# Patient Record
Sex: MALE | Race: WHITE | NOT HISPANIC OR LATINO | Employment: STUDENT | URBAN - METROPOLITAN AREA
[De-identification: names, ages, dates, MRNs, and addresses within clinical notes are randomized per-mention and may not be internally consistent; named-entity substitution may affect disease eponyms.]

---

## 2018-07-26 ENCOUNTER — OFFICE VISIT (OUTPATIENT)
Dept: FAMILY MEDICINE | Facility: CLINIC | Age: 23
End: 2018-07-26
Payer: COMMERCIAL

## 2018-07-26 VITALS
RESPIRATION RATE: 18 BRPM | HEART RATE: 89 BPM | SYSTOLIC BLOOD PRESSURE: 128 MMHG | OXYGEN SATURATION: 99 % | TEMPERATURE: 97.9 F | DIASTOLIC BLOOD PRESSURE: 62 MMHG | WEIGHT: 147 LBS | BODY MASS INDEX: 20.58 KG/M2 | HEIGHT: 71 IN

## 2018-07-26 DIAGNOSIS — F41.9 ANXIETY: Primary | ICD-10-CM

## 2018-07-26 DIAGNOSIS — F32.9 MAJOR DEPRESSIVE DISORDER WITH SINGLE EPISODE, REMISSION STATUS UNSPECIFIED: ICD-10-CM

## 2018-07-26 DIAGNOSIS — Z23 IMMUNIZATION, TETANUS-DIPHTHERIA: ICD-10-CM

## 2018-07-26 PROCEDURE — 90714 TD VACC NO PRESV 7 YRS+ IM: CPT | Performed by: FAMILY MEDICINE

## 2018-07-26 PROCEDURE — 99203 OFFICE O/P NEW LOW 30 MIN: CPT | Mod: 25 | Performed by: FAMILY MEDICINE

## 2018-07-26 PROCEDURE — 90471 IMMUNIZATION ADMIN: CPT | Performed by: FAMILY MEDICINE

## 2018-07-26 ASSESSMENT — ANXIETY QUESTIONNAIRES
1. FEELING NERVOUS, ANXIOUS, OR ON EDGE: NEARLY EVERY DAY
6. BECOMING EASILY ANNOYED OR IRRITABLE: SEVERAL DAYS
7. FEELING AFRAID AS IF SOMETHING AWFUL MIGHT HAPPEN: NEARLY EVERY DAY
2. NOT BEING ABLE TO STOP OR CONTROL WORRYING: NEARLY EVERY DAY
5. BEING SO RESTLESS THAT IT IS HARD TO SIT STILL: NEARLY EVERY DAY
IF YOU CHECKED OFF ANY PROBLEMS ON THIS QUESTIONNAIRE, HOW DIFFICULT HAVE THESE PROBLEMS MADE IT FOR YOU TO DO YOUR WORK, TAKE CARE OF THINGS AT HOME, OR GET ALONG WITH OTHER PEOPLE: EXTREMELY DIFFICULT
3. WORRYING TOO MUCH ABOUT DIFFERENT THINGS: NEARLY EVERY DAY
GAD7 TOTAL SCORE: 19

## 2018-07-26 ASSESSMENT — PATIENT HEALTH QUESTIONNAIRE - PHQ9: 5. POOR APPETITE OR OVEREATING: NEARLY EVERY DAY

## 2018-07-26 NOTE — NURSING NOTE
Screening Questionnaire for Adult Immunization    Are you sick today?   No   Do you have allergies to medications, food, a vaccine component or latex?   No   Have you ever had a serious reaction after receiving a vaccination?   No   Do you have a long-term health problem with heart disease, lung disease, asthma, kidney disease, metabolic disease (e.g. diabetes), anemia, or other blood disorder?   No   Do you have cancer, leukemia, HIV/AIDS, or any other immune system problem?   No   In the past 3 months, have you taken medications that affect  your immune system, such as prednisone, other steroids, or anticancer drugs; drugs for the treatment of rheumatoid arthritis, Crohn s disease, or psoriasis; or have you had radiation treatments?   No   Have you had a seizure, or a brain or other nervous system problem?   No   During the past year, have you received a transfusion of blood or blood     products, or been given immune (gamma) globulin or antiviral drug?   No   For women: Are you pregnant or is there a chance you could become        pregnant during the next month?   No   Have you received any vaccinations in the past 4 weeks?   No     Immunization questionnaire answers were all negative.         Patient instructed to remain in clinic for 15 minutes afterwards, and to report any adverse reaction to me immediately.       Screening performed by Zafar Sanchez on 7/26/2018 at 12:17 PM.

## 2018-07-26 NOTE — MR AVS SNAPSHOT
After Visit Summary   7/26/2018    Terrell Young    MRN: [id number]           Patient Information     Date Of Birth          1995        Visit Information        Provider Department      7/26/2018 11:00 AM Mallika Saavedra MD [facility]        Today's Diagnoses     Anxiety    -  1    Major depressive disorder with single episode, remission status unspecified        Immunization, tetanus-diphtheria           Follow-ups after your visit        Additional Services     MENTAL HEALTH REFERRAL  - Adult; Outpatient Treatment; Individual/Couples/Family/Group Therapy/Health Psychology; G: [facility] [phone]; We will contact you to schedule the appointment or please call with any questions       All scheduling is subject to the client's specific insurance plan & benefits, provider/location availability, and provider clinical specialities.  Please arrive 15 minutes early for your first appointment and bring your completed paperwork.    Please be aware that coverage of these services is subject to the terms and limitations of your health insurance plan.  Call member services at your health plan with any benefit or coverage questions.                            Who to contact     If you have questions or need follow up information about today's clinic visit or your schedule please contact [facility] directly at [phone].  Normal or non-critical lab and imaging results will be communicated to you by MyChart, letter or phone within 4 business days after the clinic has received the results. If you do not hear from us within 7 days, please contact the clinic through MyChart or phone. If you have a critical or abnormal lab result, we will notify you by phone as soon as possible.  Submit refill requests through [employer] or call your pharmacy and they will forward the refill request to us. Please allow 3 business days for your refill to be  "completed.          Additional Information About Your Visit        [employer] Information     [employer] lets you send messages to your doctor, view your test results, renew your prescriptions, schedule appointments and more. To sign up, go to [email or web address] . Click on \"Log in\" on the left side of the screen, which will take you to the Welcome page. Then click on \"Sign up Now\" on the right side of the page.     You will be asked to enter the access code listed below, as well as some personal information. Please follow the directions to create your username and password.     Your access code is: [email or web address]  Expires: 10/24/2018 11:00 AM     Your access code will  in 90 days. If you need help or a new code, please call your [facility] clinic or [phone].        Care EveryWhere ID     This is your Care EveryWhere ID. This could be used by other organizations to access your [facility] medical records  [id number]        Your Vitals Were     Pulse Temperature Respirations Height Pulse Oximetry BMI (Body Mass Index)    89 97.9  F (36.6  C) (Oral) 18 1.797 m (5' 10.75\") 99% 20.65 kg/m2       Blood Pressure from Last 3 Encounters:   18 128/62   07/10/08 94/58    Weight from Last 3 Encounters:   18 66.7 kg (147 lb)   07/10/08 34.5 kg (76 lb) (10 %)*     * Growth percentiles are based on CDC 2-20 Years data.              We Performed the Following     MENTAL HEALTH REFERRAL  - Adult; Outpatient Treatment; Individual/Couples/Family/Group Therapy/Health Psychology; [facility]: [facility] [phone]; We will contact you to schedule the appointment or please call with any questions     TD PRESERV FREEJACK (7+ YRS)          Today's Medication Changes          These changes are accurate as of 18 11:55 AM.  If you have any questions, ask your nurse or doctor.               Start taking these medicines.        Dose/Directions    sertraline 50 MG tablet   Commonly known as:  ZOLOFT   Used for: "  Anxiety   Started by:  Mallika Saavedra MD        Take 1/2 tablet (25 mg) for 1-2 weeks, then increase to 1 tablet orally daily   Quantity:  30 tablet   Refills:  0            Where to get your medicines      These medications were sent to [id number] - [place], [place] - [address]  [address] [place] [address]     Phone:  [phone]     sertraline 50 MG tablet                Primary Care Provider Office Phone # Fax #    Mallika Saavedra [phone] [address]  [facility] [address]        Equal Access to Services     [facility]: Hadii orly ku hadasho Soomaali, waaxda luqadaha, qaybta kaalmada adeegyada, tato hackett . So [facility] [phone].    ATENCIÓN: Si habla español, tiene a mendez disposición servicios gratuitos de asistencia lingüística. [facility] [phone].    We comply with applicable federal civil rights laws and [place] laws. We do not discriminate on the basis of race, color, national origin, age, disability, sex, sexual orientation, or gender identity.            Thank you!     Thank you for choosing [facility]  for your care. Our goal is always to provide you with excellent care. Hearing back from our patients is one way we can continue to improve our services. Please take a few minutes to complete the written survey that you may receive in the mail after your visit with us. Thank you!             Your Updated Medication List - Protect others around you: Learn how to safely use, store and throw away your medicines at www.disposemymeds.org.          This list is accurate as of 7/26/18 11:55 AM.  Always use your most recent med list.                   Brand Name Dispense Instructions for use Diagnosis    sertraline 50 MG tablet    ZOLOFT    30 tablet    Take 1/2 tablet (25 mg) for 1-2 weeks, then increase to 1 tablet orally daily    Anxiety

## 2018-07-26 NOTE — PROGRESS NOTES
"  SUBJECTIVE:   Terrell Young is a 22 year old male who presents to clinic today for the following health issues:      New Patient/Transfer of Care from [facility]     Abnormal Mood Symptoms  Onset: A year ago     Description:   Depression: YES  Anxiety: YES    Accompanying Signs & Symptoms:  Still participating in activities that you used to enjoy: YES  Fatigue: YES  Irritability: YES  Difficulty concentrating: YES  Changes in appetite: YES  Problems with sleep: YES  Heart racing/beating fast : YES  Thoughts of hurting yourself or others: none    History:   Recent stress: YES- will discuss with MD  Prior depression hospitalization: None  Family history of depression: YES- possibly  (seen in family members but not sure if they are dx)  Family history of anxiety: YES. Mom     Precipitating factors:   Alcohol/drug use: None, besides casual drinking.  (weekends, college drinking- yolanda drinking up to 10 beers at a time). This did not worsen with the last year but notes it was just the culture of college. Drinking much less during the summer as back home working at [employer] now.     Alleviating factors:  Keeping active - distracts the mind from wondering.    Therapies Tried and outcome: None      sx's of anxiety started about 1 year ago  Feeling restless  Notes have not really ever been able to calm down. This has been slowly building     Just finished 4th year of college- [employer]. Considering transferring to [employer] to finish degree  Transitioned few jobs in past year.   Worrying about money and classes.     Hard time focusing  Brother with ADHD and mom thinks his dad has it.   Notes has always struggled with school. Mom was a teacher so she kept him going. Never failed out but \"always been hard\"  No hyperactivity but feel \"high strung\".     Some depression over the anxiety sx's and not knowing what is going on.  Feeling of dread a lot, prevents him from doing things  This last school year did \"pretty bad\". Failed one class " "due to poor attendance.   Struggled to get to class this year. This pushed him to come in for the evaluation today.     Times where he experiences panic- one time in the past week where he \"broke down\".  Feeling of panic 1+ or more times per day: feel tight in chest, heart races, shaking, hard to focus,     Sleep: hard to stay asleep for more 2-3 hours at a time. Waking 10 x's per night. Worsens as gets closer to morning. Takes 15-20 min to fall back to sleep  Feeling tired    Denies hx of ulises type sx's.     Considering taking a semester off this fall to deal with current sx's and then transferring to [employer] school to finish.   No SI but does think aobut \"what if I were dead\".     No supplements or meds      Problem list and histories reviewed & adjusted, as indicated.  Additional history: as documented    Patient Active Problem List   Diagnosis     Major depressive disorder with single episode, remission status unspecified     Anxiety     Past Surgical History:   Procedure Laterality Date     NO HISTORY OF SURGERY         Social History   Substance Use Topics     Smoking status: Never Smoker     Smokeless tobacco: Never Used     Alcohol use Yes      Comment: yolanda drinking on weekends     Family History   Problem Relation Age of Onset     Anxiety Disorder Mother          Current Outpatient Prescriptions   Medication Sig Dispense Refill     sertraline (ZOLOFT) 50 MG tablet Take 1/2 tablet (25 mg) for 1-2 weeks, then increase to 1 tablet orally daily 30 tablet 0     No Known Allergies    Reviewed and updated as needed this visit by clinical staff  Tobacco  Allergies  Meds  Problems  Med Hx  Surg Hx  Fam Hx  Soc Hx        Reviewed and updated as needed this visit by Provider  Allergies  Meds  Problems         ROS:   ROS: 10 point ROS neg other than the symptoms noted above in the HPI.      OBJECTIVE:     /62 (BP Location: Right arm, Patient Position: Sitting, Cuff Size: Adult Regular)  Pulse 89  Temp " "97.9  F (36.6  C) (Oral)  Resp 18  Ht 1.797 m (5' 10.75\")  Wt 66.7 kg (147 lb)  SpO2 99%  BMI 20.65 kg/m2  Body mass index is 20.65 kg/(m^2).  GENERAL: healthy, alert and no distress  NECK: no adenopathy, no asymmetry, masses, or scars and thyroid normal to palpation  RESP: lungs clear to auscultation - no rales, rhonchi or wheezes  CV: regular rate and rhythm, normal S1 S2, no S3 or S4, no murmur, click or rub, no peripheral edema and peripheral pulses strong  MS: no gross musculoskeletal defects noted, no edema    Diagnostic Test Results:  PHQ-9 SCORE 7/26/2018   Total Score 18     SCAR-7 SCORE 7/26/2018   Total Score 19         ASSESSMENT/PLAN:         ICD-10-CM    1. Anxiety F41.9 sertraline (ZOLOFT) 50 MG tablet     MENTAL HEALTH REFERRAL  - Adult; Outpatient Treatment; Individual/Couples/Family/Group Therapy/Health Psychology; [facility]: [facility] [phone]; We will contact you to schedule the appointment or please call with any questions   2. Major depressive disorder with single episode, remission status unspecified F32.9 MENTAL HEALTH REFERRAL  - Adult; Outpatient Treatment; Individual/Couples/Family/Group Therapy/Health Psychology; [facility]: [facility] [phone]; We will contact you to schedule the appointment or please call with any questions   3. Immunization, tetanus-diphtheria Z23 TD PRESERV FREE, IM (7+ YRS)     New dx of anxiety and depression. Patient wanting to start medication but also willing to start counseling too.   Reviewed onset of action of meds, common side effects and plan for close f/u. Encouraged call to clinic if symptoms worsening or adverse reactions. Reviewed concern/risk of increase Suicide in teens on anti-depressants. Patient verbally contracts for safety. Depression folder with crisis phone #'s given     Total time spent with patient is 28 min with over 50% counseling regarding above information      Mallika Saavedra MD  [place] " [facility]

## 2018-07-27 ASSESSMENT — ANXIETY QUESTIONNAIRES: GAD7 TOTAL SCORE: 19

## 2018-07-27 ASSESSMENT — PATIENT HEALTH QUESTIONNAIRE - PHQ9: SUM OF ALL RESPONSES TO PHQ QUESTIONS 1-9: 18

## 2018-08-31 ENCOUNTER — TELEPHONE (OUTPATIENT)
Dept: PEDIATRICS | Facility: CLINIC | Age: 23
End: 2018-08-31

## 2018-08-31 DIAGNOSIS — F41.9 ANXIETY: ICD-10-CM

## 2018-08-31 NOTE — TELEPHONE ENCOUNTER
Reason for Call:  Medication or medication refill:    Do you use a [facility]?  Name of the pharmacy and phone number for the current request:  [facility] [address] [place] [address] N.    Name of the medication requested: Pending Prescriptions:                       Disp   Refills    sertraline (ZOLOFT) 50 MG tablet          30 tab*0            Sig: Take 1/2 tablet (25 mg) for 1-2 weeks, then           increase to 1 tablet orally daily    Other request: Please call when approved down to last tab please refill today.  Can you write for 90 days supply.    Can we leave a detailed message on this number? YES    Phone number Yesy can be reached at: [phone]    Best Time: any    Call taken on 8/31/2018 at 8:54 AM by Kari Zimmerman

## 2018-08-31 NOTE — TELEPHONE ENCOUNTER
Routing refill request to provider for review/approval because:  Provider gave limited amount when prescribed on 7/26/18. Plan not noted for follow up. Patient is wanting 90 day supply.     Nathalia Moore RN, [facility]

## 2018-09-13 ENCOUNTER — OFFICE VISIT (OUTPATIENT)
Dept: FAMILY MEDICINE | Facility: CLINIC | Age: 23
End: 2018-09-13
Payer: COMMERCIAL

## 2018-09-13 VITALS
HEART RATE: 72 BPM | DIASTOLIC BLOOD PRESSURE: 66 MMHG | BODY MASS INDEX: 19.94 KG/M2 | TEMPERATURE: 98 F | HEIGHT: 71 IN | SYSTOLIC BLOOD PRESSURE: 120 MMHG | RESPIRATION RATE: 16 BRPM | WEIGHT: 142.4 LBS | OXYGEN SATURATION: 99 %

## 2018-09-13 DIAGNOSIS — F32.9 MAJOR DEPRESSIVE DISORDER WITH SINGLE EPISODE, REMISSION STATUS UNSPECIFIED: Primary | ICD-10-CM

## 2018-09-13 DIAGNOSIS — F41.9 ANXIETY: ICD-10-CM

## 2018-09-13 PROCEDURE — 99213 OFFICE O/P EST LOW 20 MIN: CPT | Performed by: FAMILY MEDICINE

## 2018-09-13 RX ORDER — BUPROPION HYDROCHLORIDE 150 MG/1
150 TABLET ORAL EVERY MORNING
Qty: 30 TABLET | Refills: 1 | Status: SHIPPED | OUTPATIENT
Start: 2018-09-13 | End: 2018-10-04

## 2018-09-13 ASSESSMENT — PATIENT HEALTH QUESTIONNAIRE - PHQ9: 5. POOR APPETITE OR OVEREATING: NEARLY EVERY DAY

## 2018-09-13 ASSESSMENT — PAIN SCALES - GENERAL: PAINLEVEL: NO PAIN (0)

## 2018-09-13 ASSESSMENT — ANXIETY QUESTIONNAIRES
IF YOU CHECKED OFF ANY PROBLEMS ON THIS QUESTIONNAIRE, HOW DIFFICULT HAVE THESE PROBLEMS MADE IT FOR YOU TO DO YOUR WORK, TAKE CARE OF THINGS AT HOME, OR GET ALONG WITH OTHER PEOPLE: SOMEWHAT DIFFICULT
3. WORRYING TOO MUCH ABOUT DIFFERENT THINGS: NEARLY EVERY DAY
5. BEING SO RESTLESS THAT IT IS HARD TO SIT STILL: SEVERAL DAYS
2. NOT BEING ABLE TO STOP OR CONTROL WORRYING: NEARLY EVERY DAY
1. FEELING NERVOUS, ANXIOUS, OR ON EDGE: NEARLY EVERY DAY
7. FEELING AFRAID AS IF SOMETHING AWFUL MIGHT HAPPEN: MORE THAN HALF THE DAYS
GAD7 TOTAL SCORE: 16
6. BECOMING EASILY ANNOYED OR IRRITABLE: SEVERAL DAYS

## 2018-09-13 NOTE — PROGRESS NOTES
SUBJECTIVE:   Terrell Young is a 22 year old male who presents to clinic today for the following health issues:    Depression and Anxiety Follow-Up    Status since last visit: Improved Sleep improvement     Other associated symptoms: Panic attacks and generalized anxiety, shortness of breath and chest tightness     Complicating factors:     Significant life event: Yes- Related being by himself. Not socially related.      Current substance abuse: Cannabis    PHQ-9 7/26/2018   Total Score 18   Q9: Suicide Ideation Not at all     SCAR-7 SCORE 7/26/2018   Total Score 19       PHQ-9  English  PHQ-9   Any Language  SCAR-7  Suicide Assessment Five-step Evaluation and Treatment (SAFE-T)    Amount of exercise or physical activity: None    Problems taking medications regularly: No    Medication side effects: none    Diet: regular (no restrictions)    -Patient took this semester off to work and is transferring from [employer] to [employer] next semester. Patient is working 6-7 days weekly and states symptoms do not affect job. Says that work helps anxiety because it is a distraction and he has done the job for a long time so knows what he is doing   -Feels he is sleeping better since he started sertraline because it makes him more tired throughout the day. Still wakes up a few times nightly but is able to fall back asleep. Gets at least 6 hours of sleep nightly, often more   -Continues to feel generalized anxiety daily, has experienced some panic attacks. Denies specific events that trigger panic attacks, states they usually occur when he is thinking about everything he needs to do and doesn't feel like he has the energy to do everything. Panic attacks occur a few times weekly   -Mood is stable since starting sertraline  -Feels intermittent nausea daily since starting sertraline. Has decreased appetite and is concerned about weight loss, says he drinks water to try to increase appetite. Thinks appetite loss is due to both mood and  "sertraline. Denies diarrhea or headaches.  -Patient takes medication around 4pm daily with food. Denies missing doses of medication. Goes to bed around 12am and wakes around 9am    -Denies any thoughts of self-harm  -Has not been using alcohol. Smokes cannabis at least once daily, says he has done it for a couple years   -Has counseling appointment scheduled for 09/18    Problem list and histories reviewed & adjusted, as indicated.  Additional history: as documented    Patient Active Problem List   Diagnosis     Major depressive disorder with single episode, remission status unspecified     Anxiety     Past Surgical History:   Procedure Laterality Date     NO HISTORY OF SURGERY         Social History   Substance Use Topics     Smoking status: Never Smoker     Smokeless tobacco: Never Used     Alcohol use Yes      Comment: yolanda drinking on weekends     Family History   Problem Relation Age of Onset     Anxiety Disorder Mother            Reviewed and updated as needed this visit by clinical staff  Tobacco  Allergies  Meds  Med Hx  Surg Hx  Fam Hx  Soc Hx      Reviewed and updated as needed this visit by Provider         ROS:  Constitutional, HEENT, cardiovascular, pulmonary, gi and gu systems are negative, except as otherwise noted.    This document serves as a record of the services and decisions personally performed by DORIAN RAMIREZ. It was created on his/her behalf by Anurag Kaur, a trained medical scribe. The creation of this document is based on the provider's statements to the medical scribe. Anurag Kaur, September 13, 2018 9:40 AM  OBJECTIVE:     /66 (BP Location: Left arm, Patient Position: Chair, Cuff Size: Adult Regular)  Pulse 72  Temp 98  F (36.7  C) (Oral)  Resp 16  Ht 1.797 m (5' 10.75\")  Wt 64.6 kg (142 lb 6.4 oz)  SpO2 99%  BMI 20 kg/m2  Body mass index is 20 kg/(m^2).  GENERAL: healthy, alert and no distress  PSYCH: mentation appears normal, affect " normal/bright    ASSESSMENT/PLAN:     1. Major depressive disorder with single episode, remission status unspecified  2. Anxiety  Anxiety not well controlled with current dose of sertraline. Discussed options including increasing dose of sertraline, starting Wellbutrin in addition to sertraline, or switching to Prozac. Patient elected to start Wellbutrin. Reviewed onset of action of meds, common side effects and plan for close f/u. Encouraged call to clinic if symptoms worsening or adverse reactions.  - buPROPion (WELLBUTRIN XL) 150 MG 24 hr tablet; Take 1 tablet (150 mg) by mouth every morning  Dispense: 30 tablet; Refill: 1      Patient Instructions   Taking the sertraline at bedtime might help you sleep better and decrease the nausea you are feeling. If you feel it is keeping you awake, you can try moving it to breakfast time.    Take Wellbutrin first thing in the morning because if you take it too late in the day it will keep you up.     Check in in 3 weeks to see how things are going on the Wellbutrin, but let me know right away if you are experiencing major side effects. We can check in with an office visit or a phone visit.    Try eating something in the morning even if you are not hungry.    You are due for a full physical exam.     Length of visit was 19 minutes with more than 50 percent of that time used for discussing medical concerns and education    The information in this document, created by the medical scribe for me, accurately reflects the services I personally performed and the decisions made by me. I have reviewed and approved this document for accuracy.   Mallika Saavedra MD  [facility]

## 2018-09-13 NOTE — MR AVS SNAPSHOT
After Visit Summary   9/13/2018    Terrell Young    MRN: [id number]           Patient Information     Date Of Birth          1995        Visit Information        Provider Department      9/13/2018 9:20 AM Mallika Saavedra MD [facility]        Today's Diagnoses     Major depressive disorder with single episode, remission status unspecified    -  1    Anxiety          Care Instructions    Taking the sertraline at bedtime might help you sleep better and decrease the nausea you are feeling. If you feel it is keeping you awake, you can try moving it to breakfast time.    Take Wellbutrin first thing in the morning because if you take it too late in the day it will keep you up.     Check in in 3 weeks to see how things are going on the Wellbutrin, but let me know right away if you are experiencing major side effects. We can check in with an office visit or a phone visit.    Try eating something in the morning even if you are not hungry.    You are due for a full physical exam.           Follow-ups after your visit        Your next 10 appointments already scheduled     Sep 18, 2018  2:00 PM CDT   (Arrive by 1:30 PM)   New Visit with Guerda Rogers LP   [facility] ([facility])    [address] [place] [id number] [phone]              Who to contact     If you have questions or need follow up information about today's clinic visit or your schedule please contact [facility] directly at [phone].  Normal or non-critical lab and imaging results will be communicated to you by MyChart, letter or phone within 4 business days after the clinic has received the results. If you do not hear from us within 7 days, please contact the clinic through MyChart or phone. If you have a critical or abnormal lab result, we will notify you by phone as soon as possible.  Submit refill requests through [employer] or call your  "pharmacy and they will forward the refill request to us. Please allow 3 business days for your refill to be completed.          Additional Information About Your Visit        [employer] Information     [employer] gives you secure access to your electronic health record. If you see a primary care provider, you can also send messages to your care team and make appointments. If you have questions, please call your primary care clinic.  If you do not have a primary care provider, please call [phone] and they will assist you.        Care EveryWhere ID     This is your Care EveryWhere ID. This could be used by other organizations to access your [place] medical records  [id number]        Your Vitals Were     Pulse Temperature Respirations Height Pulse Oximetry BMI (Body Mass Index)    72 98  F (36.7  C) (Oral) 16 1.797 m (5' 10.75\") 99% 20 kg/m2       Blood Pressure from Last 3 Encounters:   09/13/18 120/66   07/26/18 128/62   07/10/08 94/58    Weight from Last 3 Encounters:   09/13/18 64.6 kg (142 lb 6.4 oz)   07/26/18 66.7 kg (147 lb)   07/10/08 34.5 kg (76 lb) (10 %)*     * Growth percentiles are based on CDC 2-20 Years data.              Today, you had the following     No orders found for display         Today's Medication Changes          These changes are accurate as of 9/13/18 10:00 AM.  If you have any questions, ask your nurse or doctor.               Start taking these medicines.        Dose/Directions    buPROPion 150 MG 24 hr tablet   Commonly known as:  WELLBUTRIN XL   Used for:  Major depressive disorder with single episode, remission status unspecified, Anxiety   Started by:  Mallika Saavedra MD        Dose:  150 mg   Take 1 tablet (150 mg) by mouth every morning   Quantity:  30 tablet   Refills:  1            Where to get your medicines      These medications were sent to [facility] [place], [place]  [address] [place] [address] N.  [address] [place] [address] ILANA HUGGINS [address]     Phone:  [phone]     " buPROPion 150 MG 24 hr tablet                Primary Care Provider Office Phone # Fax #    Mallika Saavedra [phone] [address] [facility] [address]  [facility] [address]        Equal Access to Services     HELENA ANTUNEZ : Hadii orly canales hadfelicianoo Soomaali, waaxda luqadaha, qaybta kaalmada adeegyada, tato josé miguelin hayjacquelinn cyndeedarlene roche lew mcgowan. So [facility] [phone].    ATENCIÓN: Si habla español, tiene a mendez disposición servicios gratuitos de asistencia lingüística. Llame al [phone].    We comply with applicable federal civil rights laws and [place] laws. We do not discriminate on the basis of race, color, national origin, age, disability, sex, sexual orientation, or gender identity.            Thank you!     Thank you for choosing [facility]  for your care. Our goal is always to provide you with excellent care. Hearing back from our patients is one way we can continue to improve our services. Please take a few minutes to complete the written survey that you may receive in the mail after your visit with us. Thank you!             Your Updated Medication List - Protect others around you: Learn how to safely use, store and throw away your medicines at www.disposemymeds.org.          This list is accurate as of 9/13/18 10:00 AM.  Always use your most recent med list.                   Brand Name Dispense Instructions for use Diagnosis    buPROPion 150 MG 24 hr tablet    WELLBUTRIN XL    30 tablet    Take 1 tablet (150 mg) by mouth every morning    Major depressive disorder with single episode, remission status unspecified, Anxiety       sertraline 50 MG tablet    ZOLOFT    90 tablet    Take 1 tablet (50 mg) by mouth daily    Anxiety

## 2018-09-13 NOTE — PATIENT INSTRUCTIONS
Taking the sertraline at bedtime might help you sleep better and decrease the nausea you are feeling. If you feel it is keeping you awake, you can try moving it to breakfast time.    Take Wellbutrin first thing in the morning because if you take it too late in the day it will keep you up.     Check in in 3 weeks to see how things are going on the Wellbutrin, but let me know right away if you are experiencing major side effects. We can check in with an office visit or a phone visit.    Try eating something in the morning even if you are not hungry.    You are due for a full physical exam.

## 2018-09-14 ASSESSMENT — ANXIETY QUESTIONNAIRES: GAD7 TOTAL SCORE: 16

## 2018-09-14 ASSESSMENT — PATIENT HEALTH QUESTIONNAIRE - PHQ9: SUM OF ALL RESPONSES TO PHQ QUESTIONS 1-9: 14

## 2018-09-18 ENCOUNTER — OFFICE VISIT (OUTPATIENT)
Dept: PSYCHOLOGY | Facility: CLINIC | Age: 23
End: 2018-09-18
Attending: FAMILY MEDICINE
Payer: COMMERCIAL

## 2018-09-18 DIAGNOSIS — F41.9 ANXIETY: ICD-10-CM

## 2018-09-18 DIAGNOSIS — F32.9 MAJOR DEPRESSIVE DISORDER WITH SINGLE EPISODE, REMISSION STATUS UNSPECIFIED: Primary | ICD-10-CM

## 2018-09-18 PROCEDURE — 90791 PSYCH DIAGNOSTIC EVALUATION: CPT | Performed by: PSYCHOLOGIST

## 2018-09-18 NOTE — MR AVS SNAPSHOT
MRN:[id number]                      After Visit Summary   9/18/2018    Terrell Young    MRN: [id number]           Visit Information        Provider Department      9/18/2018 2:00 PM Guerda Rogers LP [facility]      Your next 10 appointments already scheduled     Oct 04, 2018  8:20 AM CDT   Office Visit with Mallika Saavedra MD   [facility] ([address] [id number] [phone]           Bring a current list of meds and any records pertaining to this visit. For Physicals, please bring immunization records and any forms needing to be filled out. Please arrive 10 minutes early to complete paperwork.            Nov 06, 2018  1:00 PM CST   Return Visit with Guerda Rogers LP   [facility] ([facility])    [address] [place] [id number] [phone]              MyChart Information     [employer] gives you secure access to your electronic health record. If you see a primary care provider, you can also send messages to your care team and make appointments. If you have questions, please call your primary care clinic.  If you do not have a primary care provider, please call [phone] and they will assist you.        Care EveryWhere ID     This is your Care EveryWhere ID. This could be used by other organizations to access your [place] medical records  [id number]        Equal Access to Services     HELENA ANTUNEZ AH: Hadii aad ku hadasho Somarcosali, waaxda luqadaha, qaybta kaalmada adeegyada, tato mcgowan. So [facility] [phone].    ATENCIÓN: Si habla español, tiene a mendez disposición servicios gratuitos de asistencia lingüística. Llame al [phone].    We comply with applicable federal civil rights laws and [place] laws. We do not discriminate on the basis of race, color, national origin, age,  disability, sex, sexual orientation, or gender identity.

## 2018-09-18 NOTE — Clinical Note
Terrell Mo began therapy for anxiety and depression. He said he is taking his medications and not smoking pot for a few days. I started cognitive behavioral therapy and will work on his feeling more motivation and purpose in life. Guerda

## 2018-09-19 ASSESSMENT — ANXIETY QUESTIONNAIRES
1. FEELING NERVOUS, ANXIOUS, OR ON EDGE: NEARLY EVERY DAY
5. BEING SO RESTLESS THAT IT IS HARD TO SIT STILL: MORE THAN HALF THE DAYS
3. WORRYING TOO MUCH ABOUT DIFFERENT THINGS: NEARLY EVERY DAY
7. FEELING AFRAID AS IF SOMETHING AWFUL MIGHT HAPPEN: SEVERAL DAYS
GAD7 TOTAL SCORE: 16
6. BECOMING EASILY ANNOYED OR IRRITABLE: SEVERAL DAYS
2. NOT BEING ABLE TO STOP OR CONTROL WORRYING: NEARLY EVERY DAY
IF YOU CHECKED OFF ANY PROBLEMS ON THIS QUESTIONNAIRE, HOW DIFFICULT HAVE THESE PROBLEMS MADE IT FOR YOU TO DO YOUR WORK, TAKE CARE OF THINGS AT HOME, OR GET ALONG WITH OTHER PEOPLE: SOMEWHAT DIFFICULT

## 2018-09-19 ASSESSMENT — PATIENT HEALTH QUESTIONNAIRE - PHQ9: 5. POOR APPETITE OR OVEREATING: NEARLY EVERY DAY

## 2018-09-19 NOTE — PROGRESS NOTES
"                                                                                                                                                                        Adult Intake Structured Interview  Standard Diagnostic Assessment      CLIENT'S NAME: Terrell Young  MRN:   [id number]  :   1995  ACCT. NUMBER: [id number]  DATE OF SERVICE: 18      Identifying Information:  Client is a 22 year old, , single male. Client was referred for counseling by Dr. Karlie Cantrell at [facility]. Client is currently and taking a break from college. Client attended the session alone.       Client's Statement of Presenting Concern:  Client reports the reason for seeking therapy at this time as having anxiety and depression that has gotten worse and affecting school. He feels lack of direction and purpose in life. Client stated that his symptoms have resulted in the following functional impairments: academic performance, educational activities, management of the household and or completion of tasks, relationship(s), self-care, social interactions and work / vocational responsibilities      History of Presenting Concern:  Client reports that these problem(s) he has experienced most all of life and are worse now in college. . Client has attempted to resolve these concerns in the past through starting medications recently, talking to family and friends. Client reports that other professional(s) are involved in providing support / services.     Social History:  Client reported he grew up in [place]. They were the second born of 2 children. This is an intact family and parents remain . Client reported that his childhood was \"normal suburb upbringing, public schools, fairly involved/ caring parents.\" He played soccer in school. Client described his current relationships with family of origin as close, no outstanding issues.    Client reported a history of 1 committed " relationship in HS and college for 3 1/2 years - not now.  Client reported having 0 children. Client identified few stable and meaningful social connections. Client reported that he has not been involved with the legal system.  Client's highest education level was some college. Client did identify the following learning problems: attention, concentration, reading and but gets by because of his intelligence. There are no ethnic, cultural or Pentecostalism factors that may be relevant for therapy. Client identified his preferred language to be English. Client reported he does not need the assistance of an  or other support involved in therapy. Modifications will not be used to assist communication in therapy. Client did not serve in the .     Client reports family history includes Anxiety Disorder in his mother.    Mental Health History:  Client reported the following biological family members or relatives with mental health issues: Mother experienced Anxiety and dad may have undiagnosed depression.  Client has not been previously diagnosed with a mental health diagnosis.  Client has not received mental health services in the past.  Hospitalizations: None.  Client is currently receiving the following services: medication(s) from physician / PCP.    Chemical Health History:  Client reported no family history of chemical health issues. Client has not received chemical dependency treatment in the past. Client is not currently receiving any chemical dependency treatment. Client reports no problems as a result of their drinking / drug use.     Client Reports:  Client denies using alcohol.  Client denies using tobacco.  Client reports using marijuana 4-5 times per week and smokes 1 at a time. Client started using marijuana at age end of HS.  Client reports using caffeine 1 times per day and drinks 2 at a time. Client started using caffeine at age high school.  Client denies using street drugs.  Client denies  the non-medical use of prescription or over the counter drugs.    CAGE: C     Patient felt they ought to CUT down on your drinking (or drug use).  G     Patient felt bad or GUILTY about their drinking (or drug use).  E     Patient had a drink (or drug use) as an EYE OPENER first thing in the morning to steady his/her nerves, get the day started, or get rid of a hangover.   Based on the positive Cage-Aid score and clinical interview there  are indications of drug or alcohol abuse. Terrell said he thinks he should reduce pot use so has for the few days he is on his medications..    Discussed the general effects of drugs and alcohol on health and well-being.     Significant Losses / Trauma / Abuse / Neglect Issues:  There are no indications or report of: significant losses, trauma, abuse or neglect.    Issues of possible neglect are not present.    Medical Issues:  Client has had a physical exam to rule out medical causes for current symptoms. Date of last physical exam was within the past year. Client was encouraged to follow up with PCP if symptoms were to develop. The client has a [facility] Provider, who is named Mallika Saavedra.. The client reports not having a psychiatrist. Client reports no current medical concerns. The client denies the presence of chronic or episodic pain. There are not significant nutritional concerns. Terrell reports he loses his appetite when he is anxious.    Client reports current meds as:   Current Outpatient Prescriptions   Medication Sig     buPROPion (WELLBUTRIN XL) 150 MG 24 hr tablet Take 1 tablet (150 mg) by mouth every morning     sertraline (ZOLOFT) 50 MG tablet Take 1 tablet (50 mg) by mouth daily     No current facility-administered medications for this visit.        Client Allergies:  No Known Allergies  no known allergies to medications    Medical History:  Past Medical History:   Diagnosis Date     Allergic rhinitis, cause unspecified     seasonal allergies      Other chronic allergic conjunctivitis          Medication Adherence:  Client reports taking prescribed medications as prescribed.    Client was provided recommendation to follow-up with prescribing physician.    Mental Status Assessment:  Appearance:   Appropriate   Eye Contact:   Poor  Psychomotor Behavior: Normal  Retarded (Slowed)   Attitude:   Cooperative   Orientation:   All  Speech   Rate / Production: Normal    Volume:  Soft   Mood:    Anxious  Depressed   Affect:    Appropriate  Flat   Thought Content:  Clear  Rumination   Thought Form:  Coherent  Logical   Insight:    Fair       Review of Symptoms:  Depression: Sleep Interest Guilt Energy Concentration Appetite Psychomotor slowing or agitation Helpless Worthless Ruminations  Dona:  No symptoms  Psychosis: No symptoms  Anxiety: Worries Nervousness restless and thinks something bad will happen  Panic:  Palpitations Tremors Shortness of Breath Tingling Sense of Impending Doom  Post Traumatic Stress Disorder: No symptoms  Obsessive Compulsive Disorder: No symptoms  Eating Disorder: No symptoms  Oppositional Defiant Disorder: No symptoms  ADD / ADHD: Attention Task Completion Distractiblity  Conduct Disorder: No symptoms      Safety Assessment:    History of Safety Concerns:   Client denied a history of suicidal ideation.    Client denied a history of suicide attempts.    Client denied a history of homicidal ideation.    Client denied a history of self-injurious ideation and behaviors.    Client denied a history of personal safety concerns.    Client denied a history of assaultive behaviors.        Current Safety Concerns:  Client denies current suicidal ideation.    Client denies current homicidal ideation and behaviors.  Client denies current self-injurious ideation and behaviors.    Client denies current concerns for personal safety.    Client reports the following protective factors: positive relationships positive family connections, forward/future  oriented thinking, dedication to family/friends, safe and stable environment, effectively controls impulses, regular physical activity, sense of belonging to family and friends - soccer team, secure attachment, abstinence from substances, adherence with prescribed medication, agreement to use safety plan, living with other people, daily obligations, structured day, committment to well-being, healthy fear of risky behaviors or pain and sense of personal control or determination    Client reports there are no firearms in the house.     Plan for Safety and Risk Management:  A safety and risk management plan has not been developed at this time, however client was given the after-hours number / 911 should there be a change in any of these risk factors.    Client's Strengths and Limitations:  Client identified the following strengths or resources that will help him succeed in counseling: commitment to health and well being, nichol / spirituality, friends / good social support, family support and intelligence. Client identified the following supports: family and friends. Things that may interfere with the client's success in counseling include: not sure it will help.    Diagnostic Criteria:  A. Excessive anxiety and worry about a number of events or activities (such as work or school performance).   B. The person finds it difficult to control the worry.  C. Select 3 or more symptoms (required for diagnosis). Only one item is required in children.   - Restlessness or feeling keyed up or on edge.    - Difficulty concentrating or mind going blank.    - Irritability.    - Muscle tension.    - Sleep disturbance (difficulty falling or staying asleep, or restless unsatisfying sleep).     - The aformentioned symptoms began many year(s) ago and occurs 7 days per week and is experienced as mild.  CRITERIA (A-C) REPRESENT A MAJOR DEPRESSIVE EPISODE - SELECT THESE CRITERIA  A) Recurrent episode(s) - symptoms have been present during  the same 2-week period and represent a change from previous functioning 5 or more symptoms (required for diagnosis)   - Depressed mood. Note: In children and adolescents, can be irritable mood.     - Diminished interest or pleasure in all, or almost all, activities.    - Significant weight loss when not dieting decrease in appetite.    - Decreased sleep.    - Psychomotor activity retardation.    - Fatigue or loss of energy.    - Feelings of worthlessness or inappropriate and excessive guilt.    - Diminished ability to think or concentrate, or indecisiveness.    - Recurrent thoughts of death (not just fear of dying), NO suicidal ideation without a specific plan, or NO a suicide attempt or a specific plan for committing suicide.     Functional Status:  Client's symptoms are causing reduced functional status in the following areas: Academics / Education - changed shcools and taking a break to deal with mental health  Activities of Daily Living - low motivation, low appetite, inconsistent sleep  Occupational / Vocational - not interested, feels lack of purpose  Social / Relational - anxiety and is quiet      DSM5 Diagnoses: (Sustained by DSM5 Criteria Listed Above)  Diagnoses: 296.31 (F33.0) Major Depressive Disorder, Recurrent Episode, Mild _ and With melancholic features  300.02 (F41.1) Generalized Anxiety Disorder  Psychosocial & Contextual Factors: school challenges, lack of direction  WHODAS 2.0 (12 item)            This questionnaire asks about difficulties due to health conditions. Health conditions include disease or illnesses, other health problems that may be short or long lasting, injuries, mental health or emotional problems, and problems with alcohol or drugs.                     Think back over the past 30 days and answer these questions, thinking about how much difficulty you had doing the following activities. For each question, please [place] only one response.    S1 Standing for long periods such as 30  minutes? None =         1   S2 Taking care of household responsibilities? Mild =           2   S3 Learning a new task, for example, learning how to get to a new place? Moderate =   3   S4 How much of a problem do you have joining community activities (for example, festivals, Alevism or other activities) in the same way as anyone else can? Moderate =   3   S5 How much have you been emotionally affected by your health problems? Moderate =   3     In the past 30 days, how much difficulty did you have in:   S6 Concentrating on doing something for ten minutes? Moderate =   3   S7 Walking a long distance such as a kilometer (or equivalent)? None =         1   S8 Washing your whole body? None =         1   S9 Getting dressed? None =         1   S10 Dealing with people you do not know? Mild =           2   S11 Maintaining a friendship? None =         1   S12 Your day to day work? None =         1     H1 Overall, in the past 30 days, how many days were these difficulties present? Record number of days everyday   H2 In the past 30 days, for how many days were you totally unable to carry out your usual activities or work because of any health condition? Record number of days  none   H3 In the past 30 days, not counting the days that you were totally unable, for how many days did you cut back or reduce your usual activities or work because of any health condition? Record number of days none     Attendance Agreement:  Client has signed Attendance Agreement:No: He was running late so I prioritized talking time      Collaboration:  The client is receiving treatment / structured support from the following professional(s) / service and treatment. Collaboration will be initiated with: primary care physician.      Preliminary Treatment Plan:  The client reports no currently identified Alevism, ethnic or cultural issues relevant to therapy.     services are not indicated.    Modifications to assist communication are not  indicated.    The concerns identified by the client will be addressed in therapy.    Initial Treatment will focus on: Depressed Mood - and lack of purpose for school  Anxiety - and social fears.  The focus of initial interventions will be to alleviate anxiety, alleviate depressed mood, facilitate appropriate expression of feelings, increase ability to function adaptively, increase self esteem, reduce panic attacks, teach CBT skills, teach DBT skills, teach mindfulness skills and teach stress mangement techniques. He was taught CBT and given homework to log his thoughts.    Referral to another professional/service is not indicated at this time..    A Release of Information is not needed at this time.    Report to child / adult protection services was NA.    Client will have access to their [facility]' medical record.    Guerda Rogers, AMAURY  September 19, 2018

## 2018-09-20 ASSESSMENT — ANXIETY QUESTIONNAIRES: GAD7 TOTAL SCORE: 16

## 2018-09-20 ASSESSMENT — PATIENT HEALTH QUESTIONNAIRE - PHQ9: SUM OF ALL RESPONSES TO PHQ QUESTIONS 1-9: 15

## 2018-10-04 ENCOUNTER — OFFICE VISIT (OUTPATIENT)
Dept: FAMILY MEDICINE | Facility: CLINIC | Age: 23
End: 2018-10-04
Payer: COMMERCIAL

## 2018-10-04 VITALS
DIASTOLIC BLOOD PRESSURE: 60 MMHG | OXYGEN SATURATION: 99 % | BODY MASS INDEX: 21.14 KG/M2 | RESPIRATION RATE: 16 BRPM | TEMPERATURE: 98.2 F | HEIGHT: 71 IN | WEIGHT: 151 LBS | HEART RATE: 89 BPM | SYSTOLIC BLOOD PRESSURE: 108 MMHG

## 2018-10-04 DIAGNOSIS — Z11.4 SCREENING FOR HIV (HUMAN IMMUNODEFICIENCY VIRUS): ICD-10-CM

## 2018-10-04 DIAGNOSIS — F41.9 ANXIETY: ICD-10-CM

## 2018-10-04 DIAGNOSIS — F32.9 MAJOR DEPRESSIVE DISORDER WITH SINGLE EPISODE, REMISSION STATUS UNSPECIFIED: ICD-10-CM

## 2018-10-04 DIAGNOSIS — Z00.00 ENCOUNTER FOR ROUTINE ADULT HEALTH EXAMINATION WITHOUT ABNORMAL FINDINGS: Primary | ICD-10-CM

## 2018-10-04 DIAGNOSIS — Z11.3 SCREENING EXAMINATION FOR VENEREAL DISEASE: ICD-10-CM

## 2018-10-04 PROCEDURE — 87491 CHLMYD TRACH DNA AMP PROBE: CPT | Performed by: FAMILY MEDICINE

## 2018-10-04 PROCEDURE — 87591 N.GONORRHOEAE DNA AMP PROB: CPT | Performed by: FAMILY MEDICINE

## 2018-10-04 PROCEDURE — 99395 PREV VISIT EST AGE 18-39: CPT | Performed by: FAMILY MEDICINE

## 2018-10-04 RX ORDER — BUPROPION HYDROCHLORIDE 150 MG/1
150 TABLET ORAL EVERY MORNING
Qty: 90 TABLET | Refills: 1 | Status: SHIPPED | OUTPATIENT
Start: 2018-10-04 | End: 2019-05-13

## 2018-10-04 ASSESSMENT — ANXIETY QUESTIONNAIRES
IF YOU CHECKED OFF ANY PROBLEMS ON THIS QUESTIONNAIRE, HOW DIFFICULT HAVE THESE PROBLEMS MADE IT FOR YOU TO DO YOUR WORK, TAKE CARE OF THINGS AT HOME, OR GET ALONG WITH OTHER PEOPLE: SOMEWHAT DIFFICULT
3. WORRYING TOO MUCH ABOUT DIFFERENT THINGS: SEVERAL DAYS
GAD7 TOTAL SCORE: 6
5. BEING SO RESTLESS THAT IT IS HARD TO SIT STILL: NOT AT ALL
6. BECOMING EASILY ANNOYED OR IRRITABLE: SEVERAL DAYS
7. FEELING AFRAID AS IF SOMETHING AWFUL MIGHT HAPPEN: SEVERAL DAYS
1. FEELING NERVOUS, ANXIOUS, OR ON EDGE: SEVERAL DAYS
2. NOT BEING ABLE TO STOP OR CONTROL WORRYING: SEVERAL DAYS

## 2018-10-04 ASSESSMENT — PATIENT HEALTH QUESTIONNAIRE - PHQ9: 5. POOR APPETITE OR OVEREATING: SEVERAL DAYS

## 2018-10-04 NOTE — LETTER
My Depression Action Plan  Name: Terrell Young   Date of Birth 1995  Date: 10/4/2018    My doctor: Mallika Saavedra   My clinic: [address] [address] [phone]          GREEN    ZONE   Good Control    What it looks like:     Things are going generally well. You have normal up s and down s. You may even feel depressed from time to time, but bad moods usually last less than a day.   What you need to do:  1. Continue to care for yourself (see self care plan)  2. Check your depression survival kit and update it as needed  3. Follow your physician s recommendations including any medication.  4. Do not stop taking medication unless you consult with your physician first.           YELLOW         ZONE Getting Worse    What it looks like:     Depression is starting to interfere with your life.     It may be hard to get out of bed; you may be starting to isolate yourself from others.    Symptoms of depression are starting to last most all day and this has happened for several days.     You may have suicidal thoughts but they are not constant.   What you need to do:     1. Call your care team, your response to treatment will improve if you keep your care team informed of your progress. Yellow periods are signs an adjustment may need to be made.     2. Continue your self-care, even if you have to fake it!    3. Talk to someone in your support network    4. Open up your depression survival kit           RED    ZONE Medical Alert - Get Help    What it looks like:     Depression is seriously interfering with your life.     You may experience these or other symptoms: You can t get out of bed most days, can t work or engage in other necessary activities, you have trouble taking care of basic hygiene, or basic responsibilities, thoughts of suicide or death that will not go away, self-injurious behavior.     What you need to do:  1. Call your  care team and request a same-day appointment. If they are not available (weekends or after hours) call your local crisis line, emergency room or 911.            Depression Self Care Plan / Survival Kit    Self-Care for Depression  Here s the deal. Your body and mind are really not as separate as most people think.  What you do and think affects how you feel and how you feel influences what you do and think. This means if you do things that people who feel good do, it will help you feel better.  Sometimes this is all it takes.  There is also a place for medication and therapy depending on how severe your depression is, so be sure to consult with your medical provider and/ or Behavioral Health Consultant if your symptoms are worsening or not improving.     In order to better manage my stress, I will:    Exercise  Get some form of exercise, every day. This will help reduce pain and release endorphins, the  feel good  chemicals in your brain. This is almost as good as taking antidepressants!  This is not the same as joining a gym and then never going! (they count on that by the way ) It can be as simple as just going for a walk or doing some gardening, anything that will get you moving.      Hygiene   Maintain good hygiene (Get out of bed in the morning, Make your bed, Brush your teeth, Take a shower, and Get dressed like you were going to work, even if you are unemployed).  If your clothes don't fit try to get ones that do.    Diet  I will strive to eat foods that are good for me, drink plenty of water, and avoid excessive sugar, caffeine, alcohol, and other mood-altering substances.  Some foods that are helpful in depression are: complex carbohydrates, B vitamins, flaxseed, fish or fish oil, fresh fruits and vegetables.    Psychotherapy  I agree to participate in Individual Therapy (if recommended).    Medication  If prescribed medications, I agree to take them.  Missing doses can result in serious side effects.  I  understand that drinking alcohol, or other illicit drug use, may cause potential side effects.  I will not stop my medication abruptly without first discussing it with my provider.    Staying Connected With Others  I will stay in touch with my friends, family members, and my primary care provider/team.    Use your imagination  Be creative.  We all have a creative side; it doesn t matter if it s oil painting, sand castles, or mud pies! This will also kick up the endorphins.    Witness Beauty  (AKA stop and smell the roses) Take a look outside, even in mid-winter. Notice colors, textures. Watch the squirrels and birds.     Service to others  Be of service to others.  There is always someone else in need.  By helping others we can  get out of ourselves  and remember the really important things.  This also provides opportunities for practicing all the other parts of the program.    Humor  Laugh and be silly!  Adjust your TV habits for less news and crime-drama and more comedy.    Control your stress  Try breathing deep, massage therapy, biofeedback, and meditation. Find time to relax each day.     My support system    Clinic Contact:  Phone number:    Contact 1:  Phone number:    Contact 2:  Phone number:    Yazidi/:  Phone number:    Therapist:  Phone number:    Local crisis center:    Phone number:    Other community support:  Phone number:

## 2018-10-04 NOTE — MR AVS SNAPSHOT
After Visit Summary   10/4/2018    Terrell Young    MRN: [id number]           Patient Information     Date Of Birth          1995        Visit Information        Provider Department      10/4/2018 8:20 AM Mallika Saavedra MD [facility]        Today's Diagnoses     Encounter for routine adult health examination without abnormal findings    -  1    Screening examination for venereal disease        Screening for HIV (human immunodeficiency virus)        Major depressive disorder with single episode, remission status unspecified        Anxiety          Care Instructions      Preventive Health Recommendations  Male Ages 21 - 25     Yearly exam:             See your health care provider every year in order to  o   Review health changes.   o   Discuss preventive care.    o   Review your medicines if your doctor has prescribed any.    You should be tested each year for STDs (sexually transmitted diseases).     Talk to your provider about cholesterol testing.      If you are at risk for diabetes, you should have a diabetes test (fasting glucose).    Shots: Get a flu shot each year. Get a tetanus shot every 10 years.     Nutrition:    Eat at least 5 servings of fruits and vegetables daily.     Eat whole-grain bread, whole-wheat pasta and brown rice instead of white grains and rice.     Get adequate calcium and Vitamin D.     Lifestyle    Exercise for at least 150 minutes a week (30 minutes a day, 5 days a week). This will help you control your weight and prevent disease.     Limit alcohol to one drink per day.     No smoking.     Wear sunscreen to prevent skin cancer.     See your dentist every six months for an exam and cleaning.             Follow-ups after your visit        Your next 10 appointments already scheduled     Nov 06, 2018  1:00 PM CST   Return Visit with Guerda Rogers LP   [facility] ([facility])    [address]  "[place] [id number] [phone]              Future tests that were ordered for you today     Open Future Orders        Priority Expected Expires Ordered    **TSH with free T4 reflex FUTURE anytime Routine 10/4/2018 10/4/2019 10/4/2018    HIV Screening Routine  10/4/2019 10/4/2018    Lipid panel reflex to direct LDL Fasting Routine 10/4/2018 10/4/2019 10/4/2018            Who to contact     If you have questions or need follow up information about today's clinic visit or your schedule please contact [facility] directly at [phone].  Normal or non-critical lab and imaging results will be communicated to you by [employer], letter or phone within 4 business days after the clinic has received the results. If you do not hear from us within 7 days, please contact the clinic through [employer] or phone. If you have a critical or abnormal lab result, we will notify you by phone as soon as possible.  Submit refill requests through [employer] or call your pharmacy and they will forward the refill request to us. Please allow 3 business days for your refill to be completed.          Additional Information About Your Visit        [employer] Information     [employer] gives you secure access to your electronic health record. If you see a primary care provider, you can also send messages to your care team and make appointments. If you have questions, please call your primary care clinic.  If you do not have a primary care provider, please call [phone] and they will assist you.        Care EveryWhere ID     This is your Care EveryWhere ID. This could be used by other organizations to access your [place] medical records  [id number]        Your Vitals Were     Pulse Temperature Respirations Height Pulse Oximetry BMI (Body Mass Index)    89 98.2  F (36.8  C) (Oral) 16 1.797 m (5' 10.75\") 99% 21.21 kg/m2       Blood Pressure from Last 3 Encounters:   10/04/18 108/60   09/13/18 120/66   07/26/18 128/62    Weight from " Last 3 Encounters:   10/04/18 68.5 kg (151 lb)   09/13/18 64.6 kg (142 lb 6.4 oz)   07/26/18 66.7 kg (147 lb)              We Performed the Following     CHLAMYDIA TRACHOMATIS PCR     DEPRESSION ACTION PLAN (DAP)     NEISSERIA GONORRHOEA PCR          Where to get your medicines      These medications were sent to [facility] - [place], [place] - [address]  [address] [place] [address]     Phone:  [phone]     buPROPion 150 MG 24 hr tablet          Primary Care Provider Office Phone # Fax #    Mallika Saavedra [phone] [address]  [place] [address]        Equal Access to Services     ROSMERY ANTUNEZ : Hadii aad ku hadasho Soomaali, waaxda luqadaha, qaybta kaalmada adeegyada, tato hackett . So [facility] [phone].    ATENCIÓN: Si habla español, tiene a mendez disposición servicios gratuitos de asistencia lingüística. [facility] [phone].    We comply with applicable federal civil rights laws and [place] laws. We do not discriminate on the basis of race, color, national origin, age, disability, sex, sexual orientation, or gender identity.            Thank you!     Thank you for choosing [facility]  for your care. Our goal is always to provide you with excellent care. Hearing back from our patients is one way we can continue to improve our services. Please take a few minutes to complete the written survey that you may receive in the mail after your visit with us. Thank you!             Your Updated Medication List - Protect others around you: Learn how to safely use, store and throw away your medicines at www.disposemymeds.org.          This list is accurate as of 10/4/18  8:58 AM.  Always use your most recent med list.                   Brand Name Dispense Instructions for use Diagnosis    buPROPion 150 MG 24 hr tablet    WELLBUTRIN XL    90 tablet    Take 1 tablet (150 mg) by mouth every morning    Major  depressive disorder with single episode, remission status unspecified, Anxiety       sertraline 50 MG tablet    ZOLOFT    90 tablet    Take 1 tablet (50 mg) by mouth daily    Anxiety

## 2018-10-04 NOTE — PROGRESS NOTES
SUBJECTIVE:   CC: Terrell Young is an 22 year old male who presents for preventative health visit.     Healthy Habits:    Do you get at least three servings of calcium containing foods daily (dairy, green leafy vegetables, etc.)? yes    Amount of exercise or daily activities, outside of work: 3 day(s) per week    Problems taking medications regularly No    Medication side effects: No    Have you had an eye exam in the past two years? no    Do you see a dentist twice per year? yes    Do you have sleep apnea, excessive snoring or daytime drowsiness?no    - He reports that he has not had a previous STD screen. He relates that he is using latex condoms consistently. Denies any penile discharge, dysuria, or other urinary or infectional symptoms.  -  He notes slight hearing loss due to drumming and percussion in high school. He denies any interference with daily activities,and no worsening of sx's over time. Not interested in further eval.   - He reports slight allergy symptoms, but one of concern at this time.     Depression and Anxiety Follow-Up    Status since last visit: Improved     Other associated symptoms:None    Complicating factors:     Significant life event: No     Current substance abuse: None    - Patient reports that he is feeling better- adjusting to the Zoloft better. Wellbutrin was started last visit and notes has tolerated with no adverse side effectsand generally is doing overall better.   - Reports that his previous nausea that he initially had on his medications has resolved.   - He relates that he is sleeping much better; waking up 0-1 times a night.  - Anxiety has improved with his depression. He reports decreased anxiety over worrying about something terrible is going to happen  - He reports that he has met with therapist- discussed noticing anxiety triggers, symptoms, and management techniques.   - He continues to work and is taking the semester off. His work schedule is more manageable today.    - He reports that he has stopped using marijuana since his last visit. He also has not been drinking alcohol since starting his psychiatric mediations.     Denies any headaches, dizziness, rashes, joint pain, nail changes, vision changes, chest pain, shortness of breath, bowel or urinary changes, testicular lumps or other chages.     Acute illness  Yesterday awoke with sore throat, rhinorrhea, cough without wheeze, feverish, achy. No known sick exposure other than working.    Family history  Maternal grandfather- cardiac issues- defibrillator placed.      PHQ 7/26/2018 9/13/2018 9/19/2018   PHQ-9 Total Score 18 14 15   Q9: Suicide Ideation Not at all Not at all Not at all     SCAR-7 SCORE 7/26/2018 9/13/2018 9/19/2018   Total Score 19 16 16       PHQ-9  English  PHQ-9   Any Language  SCAR-7  Suicide Assessment Five-step Evaluation and Treatment (SAFE-T)    Today's PHQ-2 Score:   PHQ-2 ( 1999 [employer]) 9/13/2018 7/26/2018   Q1: Little interest or pleasure in doing things 1 1   Q2: Feeling down, depressed or hopeless 2 2   PHQ-2 Score 3 3     Abuse: Current or Past(Physical, Sexual or Emotional)- No  Do you feel safe in your environment - Yes    Social History   Substance Use Topics     Smoking status: Never Smoker     Smokeless tobacco: Never Used     Alcohol use Yes      Comment: yolanda drinking on weekends      If you drink alcohol do you typically have >3 drinks per day or >7 drinks per week? No                      Last PSA: No results found for: PSA    Reviewed orders with patient. Reviewed health maintenance and updated orders accordingly - Yes  BP Readings from Last 3 Encounters:   10/04/18 108/60   09/13/18 120/66   07/26/18 128/62    Wt Readings from Last 3 Encounters:   10/04/18 68.5 kg (151 lb)   09/13/18 64.6 kg (142 lb 6.4 oz)   07/26/18 66.7 kg (147 lb)        Patient Active Problem List   Diagnosis     Major depressive disorder with single episode, remission status unspecified     Anxiety     Past Surgical  "History:   Procedure Laterality Date     NO HISTORY OF SURGERY         Social History   Substance Use Topics     Smoking status: Never Smoker     Smokeless tobacco: Never Used     Alcohol use Yes      Comment: yolanda drinking on weekends     Family History   Problem Relation Age of Onset     Anxiety Disorder Mother          Current Outpatient Prescriptions   Medication Sig Dispense Refill     buPROPion (WELLBUTRIN XL) 150 MG 24 hr tablet Take 1 tablet (150 mg) by mouth every morning 30 tablet 1     sertraline (ZOLOFT) 50 MG tablet Take 1 tablet (50 mg) by mouth daily 90 tablet 0     No Known Allergies  No lab results found.     Reviewed and updated as needed this visit by clinical staff  Tobacco  Allergies  Meds  Med Hx  Surg Hx  Fam Hx  Soc Hx      Reviewed and updated as needed this visit by Provider        Past Medical History:   Diagnosis Date     Allergic rhinitis, cause unspecified     seasonal allergies     Other chronic allergic conjunctivitis       Past Surgical History:   Procedure Laterality Date     NO HISTORY OF SURGERY       ROS:  ROS: 10 point ROS neg other than the symptoms noted above in the HPI.    This document serves as a record of the services and decisions personally performed and made by Mallika Saavedra MD. It was created on his/her behalf by Kriss Crawford, trained medical scribe. The creation of this document is based the provider's statements to the medical scribes.    Scribe Kriss Crawford, 8:35 AM October 4, 2018  OBJECTIVE:   /60 (BP Location: Right arm, Patient Position: Sitting, Cuff Size: Adult Regular)  Pulse 89  Temp 98.2  F (36.8  C) (Oral)  Resp 16  Ht 1.797 m (5' 10.75\")  Wt 68.5 kg (151 lb)  SpO2 99%  BMI 21.21 kg/m2     EXAM:  GENERAL: healthy, alert and no distress  EYES: Eyes grossly normal to inspection, PERRL and conjunctivae and sclerae normal  HENT: ear canals and TM's normal, nose and mouth without ulcers or lesions  NECK: no adenopathy, no " asymmetry, masses, or scars and thyroid normal to palpation  RESP: lungs clear to auscultation - no rales, rhonchi or wheezes  CV: regular rate and rhythm, normal S1 S2, no S3 or S4, no murmur, click or rub, no peripheral edema and peripheral pulses strong  ABDOMEN: soft, nontender, no hepatosplenomegaly, no masses and bowel sounds normal   (male): normal male genitalia without lesions or urethral discharge, no hernia  MS: no gross musculoskeletal defects noted, no edema  SKIN: no suspicious lesions or rashes  NEURO: Normal strength and tone, mentation intact and speech normal  PSYCH: mentation appears normal, affect normal/bright    Diagnostic Test Results:  PHQ-9 SCORE 9/13/2018 9/19/2018 10/4/2018   Total Score 14 15 7     SCAR-7 SCORE 9/13/2018 9/19/2018 10/4/2018   Total Score 16 16 6       ASSESSMENT/PLAN:   1. Encounter for routine adult health examination without abnormal findings  Patient will return for fasting screening labs. Screening for HIV placed for patient to complete at next visit with fasting labs. Patient presents with a viral-appearing URI today. Discussed home cares, rest, NSAID or Tylenol use, and monitoring symptoms. Patient will return to clinic if symptoms do not improve or worsen.  - HIV Screening; Future  - Lipid panel reflex to direct LDL Fasting; Future    2. Screening examination for venereal disease  Routine STD screening labs for sexually active adult, initial screening, placed today; Will notify with results   - NEISSERIA GONORRHOEA PCR  - CHLAMYDIA TRACHOMATIS PCR    3. Screening for HIV (human immunodeficiency virus)  One-time screening for HIV placed for patient to complete at next visit with fasting labs.  - HIV Screening; Future    4. Major depressive disorder with single episode, remission status unspecified  5. Anxiety  Patient reports good control with added Wellbutrin. Sleep, is improved with decreased anxiety. Discussed starting a Vitamin D supplement with patient. He  "continues to follow up with therapist. Patient will Follow up in 6 months for medication management visit.  - buPROPion (WELLBUTRIN XL) 150 MG 24 hr tablet; Take 1 tablet (150 mg) by mouth every morning  Dispense: 90 tablet; Refill: 1    COUNSELING:  Reviewed preventive health counseling, as reflected in patient instructions       Regular exercise       Healthy diet/nutrition       Immunizations    Declined: Influenza due to Concurrent illness- will return when not feeling febrile.       Safe sex practices/STD prevention       HIV screeninx in teen years, 1x in adult years, and at intervals if high risk    BP Readings from Last 1 Encounters:   10/04/18 108/60     Estimated body mass index is 21.21 kg/(m^2) as calculated from the following:    Height as of this encounter: 1.797 m (5' 10.75\").    Weight as of this encounter: 68.5 kg (151 lb).     reports that he has never smoked. He has never used smokeless tobacco.    Counseling Resources:  ATP IV Guidelines  Pooled Cohorts Equation Calculator  FRAX Risk Assessment  ICSI Preventive Guidelines  Dietary Guidelines for Americans, 2010  USDA's MyPlate  ASA Prophylaxis  Lung CA Screening    The information in this document, created by the medical scribe for me, accurately reflects the services I personally performed and the decisions made by me. I have reviewed and approved this document for accuracy.      Mallika Saavedra MD  [facility]  "

## 2018-10-05 LAB
C TRACH DNA SPEC QL NAA+PROBE: NEGATIVE
N GONORRHOEA DNA SPEC QL NAA+PROBE: NEGATIVE
SPECIMEN SOURCE: NORMAL
SPECIMEN SOURCE: NORMAL

## 2018-10-05 ASSESSMENT — ANXIETY QUESTIONNAIRES: GAD7 TOTAL SCORE: 6

## 2018-10-05 ASSESSMENT — PATIENT HEALTH QUESTIONNAIRE - PHQ9: SUM OF ALL RESPONSES TO PHQ QUESTIONS 1-9: 7

## 2018-11-06 ENCOUNTER — OFFICE VISIT (OUTPATIENT)
Dept: PSYCHOLOGY | Facility: CLINIC | Age: 23
End: 2018-11-06
Payer: COMMERCIAL

## 2018-11-06 DIAGNOSIS — F41.9 ANXIETY: Primary | ICD-10-CM

## 2018-11-06 DIAGNOSIS — F32.9 MAJOR DEPRESSIVE DISORDER WITH SINGLE EPISODE, REMISSION STATUS UNSPECIFIED: ICD-10-CM

## 2018-11-06 PROCEDURE — 90834 PSYTX W PT 45 MINUTES: CPT | Performed by: PSYCHOLOGIST

## 2018-11-06 ASSESSMENT — ANXIETY QUESTIONNAIRES
5. BEING SO RESTLESS THAT IT IS HARD TO SIT STILL: NOT AT ALL
7. FEELING AFRAID AS IF SOMETHING AWFUL MIGHT HAPPEN: NOT AT ALL
IF YOU CHECKED OFF ANY PROBLEMS ON THIS QUESTIONNAIRE, HOW DIFFICULT HAVE THESE PROBLEMS MADE IT FOR YOU TO DO YOUR WORK, TAKE CARE OF THINGS AT HOME, OR GET ALONG WITH OTHER PEOPLE: SOMEWHAT DIFFICULT
GAD7 TOTAL SCORE: 4
1. FEELING NERVOUS, ANXIOUS, OR ON EDGE: SEVERAL DAYS
2. NOT BEING ABLE TO STOP OR CONTROL WORRYING: SEVERAL DAYS
3. WORRYING TOO MUCH ABOUT DIFFERENT THINGS: SEVERAL DAYS
6. BECOMING EASILY ANNOYED OR IRRITABLE: NOT AT ALL

## 2018-11-06 ASSESSMENT — PATIENT HEALTH QUESTIONNAIRE - PHQ9
5. POOR APPETITE OR OVEREATING: SEVERAL DAYS
SUM OF ALL RESPONSES TO PHQ QUESTIONS 1-9: 6

## 2018-11-06 NOTE — MR AVS SNAPSHOT
MRN:[id number]                      After Visit Summary   11/6/2018    Terrell Young    MRN: [id number]           Visit Information        Provider Department      11/6/2018 1:00 PM Guerda Rogers LP [facility]      Your next 10 appointments already scheduled     Nov 27, 2018 12:00 PM CST   Return Visit with Guerda Rogers LP   [facility] ([facility])    [address] [place] [id number] [phone]            Jan 02, 2019 10:00 AM CST   (Arrive by 9:30 AM)   New Visit with Ana Maria Greco, PhD   [facility] ([facility])    [address] [place] [address] [phone]            Jan 09, 2019  3:00 PM CST   Return Visit with Ana Maria Greco, PhD   [facility] ([facility])    [address] [place] [address] [phone]              MyChart Information     [employer] gives you secure access to your electronic health record. If you see a primary care provider, you can also send messages to your care team and make appointments. If you have questions, please call your primary care clinic.  If you do not have a primary care provider, please call [phone] and they will assist you.        Care EveryWhere ID     This is your Care EveryWhere ID. This could be used by other organizations to access your [place] medical records  [id number]        Equal Access to Services     HELENA ANTUNEZ : Hadii aad ku hadasho Soomaali, waaxda luqadaha, qaybta kaalmada adeegyada, tato mcgowan. So [facility] [phone].    ATENCIÓN: Si habla español, tiene a mendez disposición servicios gratuitos de asistencia lingüística. Llame al [phone].    We comply with applicable federal civil rights laws and [place] laws. We do not discriminate on the basis of race, color,  national origin, age, disability, sex, sexual orientation, or gender identity.

## 2018-11-06 NOTE — PROGRESS NOTES
Progress Note    Client Name: Terrell Young  Date: 11/6/2018       Service Type: Individual      Session Start Time: 1:08  Session End Time: 1:55      Session Length: 48 min     Session #: 2     Attendees: Client attended alone    Treatment Plan Last Reviewed: today  PHQ-9 / SCAR-7: today     DATA      Progress Since Last Session (Related to Symptoms / Goals / Homework):   Symptoms: Improving - he feels like meds are bringing down his anxiety and he is more calm. Only one panic attack    Homework: Partially completed, Is taking meds      Episode of Care Goals: Satisfactory progress - ACTION (Actively working towards change); Intervened by reinforcing change plan / affirming steps taken     Current / Ongoing Stressors and Concerns:   Anxiety and panic attacks   Out of school     Treatment Objective(s) Addressed in This Session:   use mindfulness daily  identify at least 2 triggers for anxiety  Decrease frequency and intensity of feeling down, depressed, hopeless  Identify negative self-talk and behaviors: challenge core beliefs, myths, and actions  identify the time in your life when you began to feel poorly about themselves  sleep at least 8 hours per night for 4 weeks  Self worth and goals     Intervention:   CBT: Worked on awareness of pressures - people to like him and doing things fast so he doesn't get behind  Interpersonal Therapy: support and social pressure  Mindfulness and referral to ADHD testing   Goals     ASSESSMENT: Current Emotional / Mental Status (status of significant symptoms):   Risk status (Self / Other harm or suicidal ideation)  Client denies a history of suicidal ideation, suicide attempts, self-injurious behavior, homicidal ideation, homicidal behavior and and other safety concerns   Client denies current fears or concerns for personal safety.   Client denies current or recent suicidal ideation or behaviors.   Client denies current or recent  homicidal ideation or behaviors.   Client denies current or recent self injurious behavior or ideation.   Client denies other safety concerns.   A safety and risk management plan has not been developed at this time, however client was given the after-hours number / 911 should there be a change in any of these risk factors.     Appearance:   Appropriate    Eye Contact:   Poor   Psychomotor Behavior: Normal  slumpted shoulders    Attitude:   Cooperative    Orientation:   All   Speech    Rate / Production: Monotone  Normal  Slow     Volume:  Normal    Mood:    Anxious  Depressed  Normal   Affect:    Appropriate  Worrisome    Thought Content:  Clear  Rumination    Thought Form:  Coherent  Logical    Insight:    Good      Medication Review:   No changes to current psychiatric medication(s)     Medication Compliance:   Yes - He finds then helpful     Changes in Health Issues:   Yes: Weight / dietary issues, Associated Psychological Distress - loss of appetite     Chemical Use Review:   Substance Use: Chemical use reviewed, no active concerns identified      Tobacco Use: No current tobacco use.       Collateral Reports Completed:   Referral to Ana Maria Greco for ADHD testing.    PLAN: (Client Tasks / Therapist Tasks / Other)  Do mindfulness practices daily. Try meditation mt. Reduce urges to be fast and to please other people.        Guerda Rogers, AMAURY                                                         ________________________________________________________________________    Treatment Plan    Client's Name: Terrell Young  YOB: 1995    Date: 11/6/2018      DSM-V Diagnoses:  296.31 (F33.0) Major Depressive Disorder, Recurrent Episode, Mild _ and With melancholic features  300.02 (F41.1) Generalized Anxiety Disorder  Psychosocial & Contextual Factors: school challenges, lack of direction  WHODAS: 22 at intake    Referral / Collaboration:  The following referral(s) will be initiated: ADHD  testing.    Anticipated number of session or this episode of care: 8      MeasurableTreatment Goal(s) related to diagnosis / functional impairment(s)  Goal 1: Client will reduce his depression and engage in life.    I will know I've met my goal when I feel a purpose.      Objective #A (Client Action)    Client will Increase interest, engagement, and pleasure in doing things  Decrease frequency and intensity of feeling down, depressed, hopeless  Improve quantity and quality of night time sleep / decrease daytime naps  Improve diet, appetite, mindful eating, and / or meal planning  Identify negative self-talk and behaviors: challenge core beliefs, myths, and actions.  Status: New - Date: 11/6/18     Intervention(s)  Therapist will assign homework Loog mood and thoughts. Positive thinking. Challenge negative thoughts  teach CBT.    Objective #B  Client will identify thoughts and  stressors which contribute to feelings of anxiety  use relaxation strategies several times per day to reduce the physical symptoms of anxiety.  Status: New - Date: 11/6/18     Intervention(s)  Therapist will assign homework meditation mt  Relaxation and breathing.      Goal 2: Client will mange ADHD symptoms and determine diagnosis    I will know I've met my goal when I can focus.      Objective #A (Client Action)    Status: New - Date: 11/6/18     Client will identify and use relaxation, mindfulness  strategies to improve organization  Identify negative self-talk and behaviors: challenge core beliefs, myths, and actions  Improve concentration, focus, and mindfulness in daily activities .    Intervention(s)  Therapist will assign homework Mindfulness daily  teach mindfulenss andOHIO.     Client has reviewed and agreed to the above plan.      Guerda Rogers, AMAURY  November 6, 2018

## 2018-11-07 ASSESSMENT — ANXIETY QUESTIONNAIRES: GAD7 TOTAL SCORE: 4

## 2018-11-23 ENCOUNTER — RADIANT APPOINTMENT (OUTPATIENT)
Dept: GENERAL RADIOLOGY | Facility: CLINIC | Age: 23
End: 2018-11-23
Attending: NURSE PRACTITIONER
Payer: COMMERCIAL

## 2018-11-23 ENCOUNTER — OFFICE VISIT (OUTPATIENT)
Dept: FAMILY MEDICINE | Facility: CLINIC | Age: 23
End: 2018-11-23
Payer: COMMERCIAL

## 2018-11-23 VITALS
BODY MASS INDEX: 20.72 KG/M2 | HEART RATE: 102 BPM | SYSTOLIC BLOOD PRESSURE: 123 MMHG | OXYGEN SATURATION: 98 % | RESPIRATION RATE: 16 BRPM | WEIGHT: 148 LBS | DIASTOLIC BLOOD PRESSURE: 70 MMHG | TEMPERATURE: 98.6 F | HEIGHT: 71 IN

## 2018-11-23 DIAGNOSIS — M25.561 ACUTE PAIN OF RIGHT KNEE: Primary | ICD-10-CM

## 2018-11-23 PROCEDURE — 99213 OFFICE O/P EST LOW 20 MIN: CPT | Performed by: NURSE PRACTITIONER

## 2018-11-23 PROCEDURE — 73562 X-RAY EXAM OF KNEE 3: CPT | Mod: RT | Performed by: FAMILY MEDICINE

## 2018-11-23 NOTE — PROGRESS NOTES
SUBJECTIVE:   Terrell Young is a 22 year old male who presents to clinic today for the following health issues:      Joint Pain/Injury    Onset: 11/19/18    Description: Patient was playing soccer going for the ball that was in the air and landed on the leg awkardly; injuring the knee.   Location: right knee   Character: No pain, but there is positional pain (standing a certain way).     Intensity: mild    Progression of Symptoms: same    Accompanying Signs & Symptoms:  Other symptoms: swelling (initally)    History:   Previous similar pain: no       Precipitating factors:   Trauma or overuse: YES- playing soccer     Alleviating factors:  Improved by: rest/inactivity    Therapies Tried and outcome: Wore a slip on leg brace - helps     [place] a 'pop', now tender. Can walk on it, but some pain. Also swollen        Problem list and histories reviewed & adjusted, as indicated.  Additional history: as documented    Patient Active Problem List   Diagnosis     Major depressive disorder with single episode, remission status unspecified     Anxiety     Past Surgical History:   Procedure Laterality Date     NO HISTORY OF SURGERY         Social History   Substance Use Topics     Smoking status: Never Smoker     Smokeless tobacco: Never Used     Alcohol use Yes      Comment: yolanda drinking on weekends,  not drinking since anxiety tx.     Family History   Problem Relation Age of Onset     Anxiety Disorder Mother      HEART DISEASE Maternal Grandfather      defibrillator         Current Outpatient Prescriptions   Medication Sig Dispense Refill     buPROPion (WELLBUTRIN XL) 150 MG 24 hr tablet Take 1 tablet (150 mg) by mouth every morning 90 tablet 1     sertraline (ZOLOFT) 50 MG tablet Take 1 tablet (50 mg) by mouth daily 90 tablet 0     No Known Allergies    Reviewed and updated as needed this visit by clinical staff  Tobacco  Allergies  Meds  Problems  Med Hx  Surg Hx  Fam Hx  Soc Hx        Reviewed and updated as needed  "this visit by Provider  Allergies  Meds  Problems         ROS:  MS    OBJECTIVE:     /70 (BP Location: Right arm, Patient Position: Sitting, Cuff Size: Adult Regular)  Pulse 102  Temp 98.6  F (37  C) (Oral)  Resp 16  Ht 1.797 m (5' 10.75\")  Wt 67.1 kg (148 lb)  SpO2 98%  BMI 20.79 kg/m2  Body mass index is 20.79 kg/(m^2).  GENERAL: healthy, alert and no distress  MS: right lower quad swollen just above knee. With flexion, medial knee also swollen. No pain with palpation or valgus/valrus, or pull test. More tenderness with walking    Diagnostic Test Results:  No results found for this or any previous visit (from the past 24 hour(s)).    ASSESSMENT/PLAN:       1. Acute pain of right knee  May need MRI, check x-ray first. Use knee brace for now.  We will also start physical therapy  - XR Knee Right 3 Views    FUTURE APPOINTMENTS:       - Follow-up visit in jacek Russo NP  [facility]  "

## 2018-11-23 NOTE — MR AVS SNAPSHOT
After Visit Summary   11/23/2018    Terrell Young    MRN: [id number]           Patient Information     Date Of Birth          1995        Visit Information        Provider Department      11/23/2018 4:00 PM Jesica Russo NP [facility]        Today's Diagnoses     Acute pain of right knee    -  1       Follow-ups after your visit        Additional Services     PHYSICAL THERAPY REFERRAL       If you have not heard from the scheduling office within 2 business days, please call [phone] for all locations, with the exception of [place], please call [phone] and [facility] [place], please call [phone].    Please be aware that coverage of these services is subject to the terms and limitations of your health insurance plan.  Call member services at your health plan with any benefit or coverage questions.                  Your next 10 appointments already scheduled     Nov 27, 2018 12:00 PM CST   Return Visit with Guerda Rogers LP   [facility]    [address] [place] [id number] [phone]            Jan 02, 2019 10:00 AM CST   (Arrive by 9:30 AM)   New Visit with Ana Maria Greco, PhD   [facility]    [address] [place] [phone] [phone]            Jan 09, 2019  3:00 PM CST   Return Visit with Ana Maria Greco, PhD   [facility]    [address] [place] [id number] [phone]              Future tests that were ordered for you today     Open Future Orders        Priority Expected Expires Ordered    PHYSICAL THERAPY REFERRAL Routine  11/23/2019 11/23/2018            Who to contact     If you have questions or need follow up information about today's clinic visit or your schedule please contact [facility] directly at  "[phone].  Normal or non-critical lab and imaging results will be communicated to you by MyChart, letter or phone within 4 business days after the clinic has received the results. If you do not hear from us within 7 days, please contact the clinic through kaleohart or phone. If you have a critical or abnormal lab result, we will notify you by phone as soon as possible.  Submit refill requests through Bookitit or call your pharmacy and they will forward the refill request to us. Please allow 3 business days for your refill to be completed.          Additional Information About Your Visit        kaleoharLittle Big Things Information     Bookitit gives you secure access to your electronic health record. If you see a primary care provider, you can also send messages to your care team and make appointments. If you have questions, please call your primary care clinic.  If you do not have a primary care provider, please call [phone] and they will assist you.        Care EveryWhere ID     This is your Care EveryWhere ID. This could be used by other organizations to access your [place] medical records  [id number]        Your Vitals Were     Pulse Temperature Respirations Height Pulse Oximetry BMI (Body Mass Index)    102 98.6  F (37  C) (Oral) 16 1.797 m (5' 10.75\") 98% 20.79 kg/m2       Blood Pressure from Last 3 Encounters:   11/23/18 123/70   10/04/18 108/60   09/13/18 120/66    Weight from Last 3 Encounters:   11/23/18 67.1 kg (148 lb)   10/04/18 68.5 kg (151 lb)   09/13/18 64.6 kg (142 lb 6.4 oz)              We Performed the Following     XR Knee Right 3 Views        Primary Care Provider Office Phone # Fax #    Mallika Saavedra [phone] [address]  [facility] [address]        Equal Access to Services     HELENA ANTUNEZ AH: Sally lacyo Soizabella, waaxda luqadaha, qaybta kaalmada adedarleneyaduncan, tato mcgowan. So [facility] [phone].    ATENCIÓN: Si habla " español, tiene a mendez disposición servicios gratuitos de asistencia lingüística. Sandra lockwood [phone].    We comply with applicable federal civil rights laws and [place] laws. We do not discriminate on the basis of race, color, national origin, age, disability, sex, sexual orientation, or gender identity.            Thank you!     Thank you for choosing [facility]  for your care. Our goal is always to provide you with excellent care. Hearing back from our patients is one way we can continue to improve our services. Please take a few minutes to complete the written survey that you may receive in the mail after your visit with us. Thank you!             Your Updated Medication List - Protect others around you: Learn how to safely use, store and throw away your medicines at www.disposemymeds.org.          This list is accurate as of 11/23/18  4:34 PM.  Always use your most recent med list.                   Brand Name Dispense Instructions for use Diagnosis    buPROPion 150 MG 24 hr tablet    WELLBUTRIN XL    90 tablet    Take 1 tablet (150 mg) by mouth every morning    Major depressive disorder with single episode, remission status unspecified, Anxiety       sertraline 50 MG tablet    ZOLOFT    90 tablet    Take 1 tablet (50 mg) by mouth daily    Anxiety

## 2019-01-09 ENCOUNTER — FCC EXTENDED DOCUMENTATION (OUTPATIENT)
Dept: PSYCHOLOGY | Facility: CLINIC | Age: 24
End: 2019-01-09

## 2019-01-09 ENCOUNTER — OFFICE VISIT (OUTPATIENT)
Dept: PSYCHOLOGY | Facility: CLINIC | Age: 24
End: 2019-01-09
Payer: COMMERCIAL

## 2019-01-09 DIAGNOSIS — F33.1 MAJOR DEPRESSIVE DISORDER, RECURRENT EPISODE, MODERATE (H): ICD-10-CM

## 2019-01-09 DIAGNOSIS — F41.1 GENERALIZED ANXIETY DISORDER: Primary | ICD-10-CM

## 2019-01-09 PROCEDURE — 90791 PSYCH DIAGNOSTIC EVALUATION: CPT | Performed by: PSYCHOLOGIST

## 2019-01-09 ASSESSMENT — PATIENT HEALTH QUESTIONNAIRE - PHQ9
SUM OF ALL RESPONSES TO PHQ QUESTIONS 1-9: 12
5. POOR APPETITE OR OVEREATING: NEARLY EVERY DAY

## 2019-01-09 ASSESSMENT — ANXIETY QUESTIONNAIRES
6. BECOMING EASILY ANNOYED OR IRRITABLE: SEVERAL DAYS
2. NOT BEING ABLE TO STOP OR CONTROL WORRYING: SEVERAL DAYS
1. FEELING NERVOUS, ANXIOUS, OR ON EDGE: MORE THAN HALF THE DAYS
GAD7 TOTAL SCORE: 11
3. WORRYING TOO MUCH ABOUT DIFFERENT THINGS: MORE THAN HALF THE DAYS
IF YOU CHECKED OFF ANY PROBLEMS ON THIS QUESTIONNAIRE, HOW DIFFICULT HAVE THESE PROBLEMS MADE IT FOR YOU TO DO YOUR WORK, TAKE CARE OF THINGS AT HOME, OR GET ALONG WITH OTHER PEOPLE: SOMEWHAT DIFFICULT
7. FEELING AFRAID AS IF SOMETHING AWFUL MIGHT HAPPEN: SEVERAL DAYS
5. BEING SO RESTLESS THAT IT IS HARD TO SIT STILL: SEVERAL DAYS
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
## 2019-01-10 ASSESSMENT — ANXIETY QUESTIONNAIRES: GAD7 TOTAL SCORE: 11

## 2019-01-15 ENCOUNTER — DOCUMENTATION ONLY (OUTPATIENT)
Dept: PSYCHOLOGY | Facility: CLINIC | Age: 24
End: 2019-01-15
Payer: COMMERCIAL

## 2019-01-15 DIAGNOSIS — F41.1 GENERALIZED ANXIETY DISORDER: ICD-10-CM

## 2019-01-15 DIAGNOSIS — F33.1 MAJOR DEPRESSIVE DISORDER, RECURRENT EPISODE, MODERATE (H): Primary | ICD-10-CM

## 2019-01-15 PROCEDURE — 96130 PSYCL TST EVAL PHYS/QHP 1ST: CPT | Performed by: PSYCHOLOGIST

## 2019-01-15 NOTE — PROGRESS NOTES
Client Name: Terrell Young  MRN: [id number]  : 1995    Client completed the [place] Multiphasic Personality Inventory-2 (MMPI-2), a self-report personality inventory, as part of his evaluation. Validity scales indicate that the client responded in an open and consistent manner, resulting in a valid profile. The following results are likely to be an accurate reflection of client's current functioning. Individuals with similar profiles tend to generally see themselves as well adjusted and as sufficiently able to cope with the difficulties they face that they feel little or no need to draw attention to them. Client s responses reflect high levels of general emotional distress. Individuals with similar profiles experience depression with anxiety, tension, worry, and apprehension. They may feel helpless and hopeless at times and have issues with concentration, memory, judgment, and decision making. They may be withdrawn and introverted and feel awkward and easily embarrassed around others. They experience lack of drive, energy, interest, and motivation and may be unable to complete normal tasks and duties. They may feel stressed out and vulnerable to upset by disappointment or decisions that don t work out. They may dread that a sudden unanticipated event will cause one to  go to pieces.

## 2019-02-18 ENCOUNTER — E-VISIT (OUTPATIENT)
Dept: FAMILY MEDICINE | Facility: CLINIC | Age: 24
End: 2019-02-18
Payer: COMMERCIAL

## 2019-02-18 DIAGNOSIS — F41.9 ANXIETY: ICD-10-CM

## 2019-02-18 PROCEDURE — 99444 ZZC PHYSICIAN ONLINE EVALUATION & MANAGEMENT SERVICE: CPT | Performed by: FAMILY MEDICINE

## 2019-02-18 ASSESSMENT — ANXIETY QUESTIONNAIRES
3. WORRYING TOO MUCH ABOUT DIFFERENT THINGS: SEVERAL DAYS
GAD7 TOTAL SCORE: 7
1. FEELING NERVOUS, ANXIOUS, OR ON EDGE: SEVERAL DAYS
4. TROUBLE RELAXING: SEVERAL DAYS
5. BEING SO RESTLESS THAT IT IS HARD TO SIT STILL: SEVERAL DAYS
GAD7 TOTAL SCORE: 7
2. NOT BEING ABLE TO STOP OR CONTROL WORRYING: SEVERAL DAYS
GAD7 TOTAL SCORE: 7
6. BECOMING EASILY ANNOYED OR IRRITABLE: SEVERAL DAYS
7. FEELING AFRAID AS IF SOMETHING AWFUL MIGHT HAPPEN: SEVERAL DAYS
7. FEELING AFRAID AS IF SOMETHING AWFUL MIGHT HAPPEN: SEVERAL DAYS

## 2019-02-18 ASSESSMENT — PATIENT HEALTH QUESTIONNAIRE - PHQ9
SUM OF ALL RESPONSES TO PHQ QUESTIONS 1-9: 7
10. IF YOU CHECKED OFF ANY PROBLEMS, HOW DIFFICULT HAVE THESE PROBLEMS MADE IT FOR YOU TO DO YOUR WORK, TAKE CARE OF THINGS AT HOME, OR GET ALONG WITH OTHER PEOPLE: SOMEWHAT DIFFICULT
SUM OF ALL RESPONSES TO PHQ QUESTIONS 1-9: 7

## 2019-02-19 ASSESSMENT — ANXIETY QUESTIONNAIRES: GAD7 TOTAL SCORE: 7

## 2019-02-19 ASSESSMENT — PATIENT HEALTH QUESTIONNAIRE - PHQ9: SUM OF ALL RESPONSES TO PHQ QUESTIONS 1-9: 7

## 2019-02-19 NOTE — TELEPHONE ENCOUNTER
PHQ-9 SCORE 11/6/2018 1/9/2019 2/18/2019   PHQ-9 Total Score MyChart - - 7 (Mild depression)   PHQ-9 Total Score 6 12 7     SCAR-7 SCORE 11/6/2018 1/9/2019 2/18/2019   Total Score - - 7 (mild anxiety)   Total Score 4 11 7

## 2019-03-22 DIAGNOSIS — F41.9 ANXIETY: ICD-10-CM

## 2019-03-25 NOTE — TELEPHONE ENCOUNTER
"Requested Prescriptions   Pending Prescriptions Disp Refills     FLUoxetine (PROZAC) 20 MG capsule [Pharmacy Med Name: FLUoxetine HCl Oral Capsule 20 MG]  Last Written Prescription Date:  2/18/19  Last Fill Quantity: 30 capsule,  # refills: 1   Last office visit: 11/23/2018 with prescribing provider:  Dr. Barriga   Future Office Visit:     30 capsule 0     Sig: Take 1 capsule (20 mg) by mouth daily    SSRIs Protocol Passed - 3/22/2019  5:15 PM       Passed - Recent (12 mo) or future (30 days) visit within the authorizing provider's specialty    Patient had office visit in the last 12 months or has a visit in the next 30 days with authorizing provider or within the authorizing provider's specialty.  See \"Patient Info\" tab in inbasket, or \"Choose Columns\" in Meds & Orders section of the refill encounter.             Passed - Medication is active on med list       Passed - Patient is age 18 or older          "

## 2019-05-13 ENCOUNTER — TELEPHONE (OUTPATIENT)
Dept: FAMILY MEDICINE | Facility: CLINIC | Age: 24
End: 2019-05-13

## 2019-05-13 DIAGNOSIS — F41.9 ANXIETY: ICD-10-CM

## 2019-05-13 DIAGNOSIS — F32.9 MAJOR DEPRESSIVE DISORDER WITH SINGLE EPISODE, REMISSION STATUS UNSPECIFIED: ICD-10-CM

## 2019-05-13 NOTE — TELEPHONE ENCOUNTER
"Requested Prescriptions   Pending Prescriptions Disp Refills     buPROPion (WELLBUTRIN XL) 150 MG 24 hr tablet [Pharmacy Med Name: buPROPion HCl ER (XL) Oral Tablet Extended Release 24 Hour 150 MG] 90 tablet 0     Sig: Take 1 tablet by mouth every morning       SSRIs Protocol Failed - 5/13/2019  2:47 PM        Failed - PHQ-9 score less than 5 in past 6 months     Please review last PHQ-9 score.           Passed - Medication is Bupropion     If the medication is Bupropion (Wellbutrin), and the patient is taking for smoking cessation; OK to refill.          Passed - Medication is active on med list        Passed - Patient is age 18 or older        Passed - Recent (6 mo) or future (30 days) visit within the authorizing provider's specialty     Patient had office visit in the last 6 months or has a visit in the next 30 days with authorizing provider or within the authorizing provider's specialty.  See \"Patient Info\" tab in inbasket, or \"Choose Columns\" in Meds & Orders section of the refill encounter.            buPROPion (WELLBUTRIN XL) 150 MG 24 hr tablet  Last Written Prescription Date:  10/4/18  Last Fill Quantity: 90,  # refills: 1   Last office visit: 11/23/2018 with prescribing provider:  Dr. Saavedra   Future Office Visit:      PHQ-9 score:    PHQ-9 SCORE 2/18/2019   PHQ-9 Total Score MyChart 7 (Mild depression)   PHQ-9 Total Score 7             SCAR-7 SCORE 11/6/2018 1/9/2019 2/18/2019   Total Score - - 7 (mild anxiety)   Total Score 4 11 7         "

## 2019-05-15 RX ORDER — BUPROPION HYDROCHLORIDE 150 MG/1
150 TABLET ORAL EVERY MORNING
Qty: 30 TABLET | Refills: 0 | Status: SHIPPED | OUTPATIENT
Start: 2019-05-15 | End: 2019-06-19

## 2019-05-15 NOTE — TELEPHONE ENCOUNTER
PHQ-9 SCORE 11/6/2018 1/9/2019 2/18/2019   PHQ-9 Total Score MyChart - - 7 (Mild depression)   PHQ-9 Total Score 6 12 7     Routing refill request to provider for review/approval because:  PHQ-9 is 5 or more. Per protocol, RN cannot refill if PHQ-9 is over 4.          Howie Diggs RN, BSN, PHN

## 2019-06-05 ENCOUNTER — TELEPHONE (OUTPATIENT)
Dept: FAMILY MEDICINE | Facility: CLINIC | Age: 24
End: 2019-06-05

## 2019-06-05 NOTE — TELEPHONE ENCOUNTER
Panel Management Review   One phone call and send letter if unable to reach them or MyChart message and send letter if not read after 2 weeks (You will get a message to your inbasket)      BP Readings from Last 1 Encounters:   11/23/18 123/70        Health Maintenance Due   Topic Date Due     HIV SCREENING  12/18/2010        Fail List measure:     Depression / Dysthymia review    Measure:  Needs PHQ-9 score of 4 or less during index window.  Administer PHQ-9 and if score is 5 or more, send encounter to provider for next steps.        PHQ-9 SCORE 11/6/2018 1/9/2019 2/18/2019   PHQ-9 Total Score MyChart - - 7 (Mild depression)   PHQ-9 Total Score 6 12 7       If PHQ-9 recheck is 5 or more, route to provider for next steps.    Patient is due for:  PHQ9        Patient is due/failing the following:   PHQ9    Action needed:   Patient needs to do PHQ9.    Type of outreach:    Phone, left message for patient to call back. , Sent MyChart message. and Sent letter.    [id number], MEDICAL ASSISTANT

## 2019-06-05 NOTE — LETTER
June 5, 2019      Terrell Young  [address]  [facility] [address]        Dear Terrell,       Your provider has reviewed your chart. With your depression and anxiety, it is important that we keep a close eye on at least every 6 months. Please complete the attached assessment and return it to clinic or call to update together over the phone at [phone].       Thanks,   [facility] Team

## 2019-06-19 ENCOUNTER — TELEPHONE (OUTPATIENT)
Dept: FAMILY MEDICINE | Facility: CLINIC | Age: 24
End: 2019-06-19

## 2019-06-19 DIAGNOSIS — F41.9 ANXIETY: ICD-10-CM

## 2019-06-19 DIAGNOSIS — F32.9 MAJOR DEPRESSIVE DISORDER WITH SINGLE EPISODE, REMISSION STATUS UNSPECIFIED: ICD-10-CM

## 2019-06-19 NOTE — LETTER
[address]  [address]  [phone]    June 21, 2019      Terrell Young  [address]  [facility] [address]      Dear Terrell,    We have refilled your Wellbutrin for 2 weeks.   We will need to see you for an office visit before any additional refills can be given.  Please call [phone] to schedule this appointment.      Thank you,    [facility]

## 2019-06-19 NOTE — TELEPHONE ENCOUNTER
"Requested Prescriptions   Pending Prescriptions Disp Refills     buPROPion (WELLBUTRIN XL) 150 MG 24 hr tablet [Pharmacy Med Name: buPROPion HCl ER (XL) Oral Tablet Extended Release 24 Hour 150 MG]  Last Written Prescription Date:  5/15/19  Last Fill Quantity: 30 tablet,  # refills: 0   Last office visit: 11/23/2018 with prescribing provider:  Dr. Saavedra   Future Office Visit:     30 tablet 0     Sig: TAKE 1 TABLET BY MOUTH EVERY MORNING       SSRIs Protocol Failed - 6/19/2019  7:01 AM        Failed - PHQ-9 score less than 5 in past 6 months     Please review last PHQ-9 score.   PHQ-9 SCORE 11/6/2018 1/9/2019 2/18/2019   PHQ-9 Total Score MyChart - - 7 (Mild depression)   PHQ-9 Total Score 6 12 7     SCAR-7 SCORE 11/6/2018 1/9/2019 2/18/2019   Total Score - - 7 (mild anxiety)   Total Score 4 11 7               Failed - Recent (6 mo) or future (30 days) visit within the authorizing provider's specialty     Patient had office visit in the last 6 months or has a visit in the next 30 days with authorizing provider or within the authorizing provider's specialty.  See \"Patient Info\" tab in inbasket, or \"Choose Columns\" in Meds & Orders section of the refill encounter.            Passed - Medication is Bupropion     If the medication is Bupropion (Wellbutrin), and the patient is taking for smoking cessation; OK to refill.          Passed - Medication is active on med list        Passed - Patient is age 18 or older          "

## 2019-06-20 RX ORDER — BUPROPION HYDROCHLORIDE 150 MG/1
150 TABLET ORAL EVERY MORNING
Qty: 14 TABLET | Refills: 0 | Status: SHIPPED | OUTPATIENT
Start: 2019-06-20 | End: 2019-08-15

## 2019-06-20 NOTE — TELEPHONE ENCOUNTER
Routing refill request to provider for review/approval because:  Cynthia given x1 and patient did not follow up, please advise  Rosa Isela Dickey RN

## 2019-06-20 NOTE — TELEPHONE ENCOUNTER
Does not look like he has been reading his [email or web address] messages regarding need for f/u visit. Will send 2 week supply but send letter for med check appt needed

## 2019-06-28 ENCOUNTER — DOCUMENTATION ONLY (OUTPATIENT)
Dept: PSYCHOLOGY | Facility: CLINIC | Age: 24
End: 2019-06-28
Payer: COMMERCIAL

## 2019-06-28 DIAGNOSIS — F41.1 GENERALIZED ANXIETY DISORDER: Primary | ICD-10-CM

## 2019-06-28 DIAGNOSIS — F33.1 MAJOR DEPRESSIVE DISORDER, RECURRENT EPISODE, MODERATE (H): ICD-10-CM

## 2019-06-28 PROCEDURE — 96130 PSYCL TST EVAL PHYS/QHP 1ST: CPT | Mod: 59 | Performed by: PSYCHOLOGIST

## 2019-06-28 NOTE — PROGRESS NOTES
Client Name:   Terrell Young                  Date:  6/28/19      Service Type: Psychological testing      Session Start Time:   12:00                       Session End Time: 13:00      Session Length: 60 mins          Intervention:  Documentation only encounter: Muna self- and other-report questionnaires (BAARS-IV, BDEFS, BFIS) scored and interpreted as part of ADHD assessment.          PLAN: (Client Tasks / Therapist Tasks / Other)  Will request Client schedule appointment for further testing--WAIS-IV needed. Will then integrate all data obtained in this assessment and will have Client return for testing feedback. Current diagnostic impressions: Rule out ADHD vs. anxiety.

## 2019-07-07 DIAGNOSIS — F41.9 ANXIETY: ICD-10-CM

## 2019-07-10 NOTE — TELEPHONE ENCOUNTER
Routing refill request to provider for review/approval because:  A break in medication last filled on 3/25/19 for 30 plus one.    Nathalia Moore RN, [facility]

## 2019-08-15 ENCOUNTER — OFFICE VISIT (OUTPATIENT)
Dept: FAMILY MEDICINE | Facility: CLINIC | Age: 24
End: 2019-08-15
Payer: COMMERCIAL

## 2019-08-15 VITALS
WEIGHT: 145 LBS | TEMPERATURE: 98.3 F | DIASTOLIC BLOOD PRESSURE: 70 MMHG | OXYGEN SATURATION: 99 % | BODY MASS INDEX: 20.3 KG/M2 | SYSTOLIC BLOOD PRESSURE: 112 MMHG | RESPIRATION RATE: 16 BRPM | HEART RATE: 89 BPM | HEIGHT: 71 IN

## 2019-08-15 DIAGNOSIS — F41.9 ANXIETY: ICD-10-CM

## 2019-08-15 DIAGNOSIS — F32.9 MAJOR DEPRESSIVE DISORDER WITH SINGLE EPISODE, REMISSION STATUS UNSPECIFIED: ICD-10-CM

## 2019-08-15 PROCEDURE — 99213 OFFICE O/P EST LOW 20 MIN: CPT | Performed by: FAMILY MEDICINE

## 2019-08-15 RX ORDER — BUPROPION HYDROCHLORIDE 150 MG/1
150 TABLET ORAL EVERY MORNING
Qty: 90 TABLET | Refills: 3 | Status: SHIPPED | OUTPATIENT
Start: 2019-08-15 | End: 2020-10-27

## 2019-08-15 RX ORDER — FLUOXETINE 40 MG/1
40 CAPSULE ORAL DAILY
Qty: 30 CAPSULE | Refills: 2 | Status: SHIPPED | OUTPATIENT
Start: 2019-08-15 | End: 2019-12-26

## 2019-08-15 ASSESSMENT — ANXIETY QUESTIONNAIRES
2. NOT BEING ABLE TO STOP OR CONTROL WORRYING: SEVERAL DAYS
6. BECOMING EASILY ANNOYED OR IRRITABLE: NOT AT ALL
GAD7 TOTAL SCORE: 4
1. FEELING NERVOUS, ANXIOUS, OR ON EDGE: SEVERAL DAYS
7. FEELING AFRAID AS IF SOMETHING AWFUL MIGHT HAPPEN: NOT AT ALL
5. BEING SO RESTLESS THAT IT IS HARD TO SIT STILL: NOT AT ALL
3. WORRYING TOO MUCH ABOUT DIFFERENT THINGS: SEVERAL DAYS

## 2019-08-15 ASSESSMENT — MIFFLIN-ST. JEOR: SCORE: 1670.88

## 2019-08-15 ASSESSMENT — PATIENT HEALTH QUESTIONNAIRE - PHQ9
5. POOR APPETITE OR OVEREATING: SEVERAL DAYS
SUM OF ALL RESPONSES TO PHQ QUESTIONS 1-9: 3

## 2019-08-15 NOTE — PATIENT INSTRUCTIONS
Start taking fluoxetine in the morning to see if it helps improve your sleep.    Increase fluoxetine to 40 mg daily.    Don't drink caffeine after 10 am.    I encourage you to use a medicine box to keep track of your medications more easily.     At [facility], we strive to deliver an exceptional experience to you, every time we see you.  If you receive a survey in the mail, please send us back your thoughts. We really do value your feedback.    Your care team:     Family Medicine   BALAJI Salazar MD Emily Bunt, APRN CNP S. MD Trinh Arauz MD Angela Wermerskirchen, MD         Clinic hours: Monday - Wednesday 7 am-7 pm   Thursdays and Fridays 7 am-5 pm.     [facility]: Monday - Friday 11 am-9 pm,   Saturday and Sunday 9 am-5 pm.    [facility]: Monday -Thursday 8 am-8 pm; Friday 8 am-6 pm; Saturday and Sunday 9 am-5 pm.     [facility]: Monday - Thursday 8 am - 7 pm; Friday 8 am - 6 pm    Clinic: [phone]   [facility]: [phone]   [facility]: [phone]

## 2019-08-15 NOTE — PROGRESS NOTES
Subjective     Terrell Young is a 23 year old male who presents to clinic today for the following health issues:    HPI   Depression and Anxiety Follow-Up    How are you doing with your depression since your last visit? Improved     How are you doing with your anxiety since your last visit?  Improved     Are you having other symptoms that might be associated with depression or anxiety? Yes:  a little bit of anxiety. Depression is under control per pt     Have you had a significant life event? Switched job      Do you have any concerns with your use of alcohol or other drugs? No    -Pt says his depression is fairly under control. His anxiety still persists but it is less frequent, usually occurring when he thinks about things too much. He is usually able to bring himself down on his own and relax.  -His Wellbutrin ran out about 1.5 weeks ago. He thinks his anxiety might have been a little flared, but he has not noticed any symptoms.   -He misses doses of fluoxetine about once per week, he doesn't notice any side effects when he does.   -He continues to struggle with sleep. He can fall asleep but it doesn't feel deep and he is very restless. He wakes about 5 times per night. He has also been having very vivid, weird dreams that are mostly negative. Feels sleep has much improved on the meds that previous to treatment.   -His transition to fluoxetine in February went well. He takes the fluoxetine at night, and bupropion in the morning.   -He has tried OTC melatonin and did not notice much of an effect when he tried it.   -Pt drinks alcohol occasionally at events such as sport games or birthday parties, but has cut back on his consumption significantly.   -He has decreased his caffeine consumption to see if it helped his sleep. He is drinking about 4-6 cups of coffee in the morning and usually doesn't drink it past 10 am.   -Denies self harm thoughts   -He has problems concentrating that he thinks is related to a  combination of his not sleeping well and his anxiety.   -He did an ADD/ADHD screening exam and has an appointment soon to discuss results.   -He will be starting school again in a month at [employer]. He is continuing with criminal justice but is not sure what he wants to do with it.   -Recently quit his job at [employer] and started a new job. He is happy with this transition because he felt a lot of his stress revolved around his old job.        Social History     Tobacco Use     Smoking status: Never Smoker     Smokeless tobacco: Never Used   Substance Use Topics     Alcohol use: Yes     Comment: yolanda drinking on weekends,  not drinking since anxiety tx.     Drug use: No     Types: Marijuana     PHQ 11/6/2018 1/9/2019 2/18/2019   PHQ-9 Total Score 6 12 7   Q9: Thoughts of better off dead/self-harm past 2 weeks Not at all Not at all Not at all     SCAR-7 SCORE 11/6/2018 1/9/2019 2/18/2019   Total Score - - 7 (mild anxiety)   Total Score 4 11 7     no suicide ideation    Suicide Assessment Five-step Evaluation and Treatment (SAFE-T)      How many servings of fruits and vegetables do you eat daily?  2-3    On average, how many sweetened beverages do you drink each day (soda, juice, sweet tea, etc)?   1    How many days per week do you miss taking your medication? 0            Patient Active Problem List   Diagnosis     Major depressive disorder with single episode, remission status unspecified     Anxiety     Past Surgical History:   Procedure Laterality Date     NO HISTORY OF SURGERY         Social History     Tobacco Use     Smoking status: Never Smoker     Smokeless tobacco: Never Used   Substance Use Topics     Alcohol use: Yes     Comment: yolanda drinking on weekends,  not drinking since anxiety tx.     Family History   Problem Relation Age of Onset     Anxiety Disorder Mother      Heart Disease Maternal Grandfather         defibrillator     Attention Deficit Disorder Brother              Reviewed and updated as  "needed this visit by Provider         Review of Systems   ROS COMP: Constitutional, HEENT, cardiovascular, pulmonary, gi and gu systems are negative, except as otherwise noted.    This document serves as a record of the services and decisions personally performed by DORIAN RAMIREZ. It was created on his/her behalf by Nayeli Rizzo, a trained medical scribe. The creation of this document is based on the provider's statements to the medical scribe. Nayeli Rizzo, August 15, 2019 2:59 PM        Objective    /70 (BP Location: Right arm, Patient Position: Sitting, Cuff Size: Adult Regular)   Pulse 89   Temp 98.3  F (36.8  C) (Oral)   Resp 16   Ht 1.797 m (5' 10.75\")   Wt 65.8 kg (145 lb)   SpO2 99%   BMI 20.37 kg/m    Body mass index is 20.37 kg/m .  Physical Exam   GENERAL: healthy, alert and no distress  RESP: lungs clear to auscultation - no rales, rhonchi or wheezes  CV: regular rate and rhythm, normal S1 S2, no S3 or S4, no murmur, click or rub, no peripheral edema and peripheral pulses strong  MS: no gross musculoskeletal defects noted, no edema            Assessment & Plan       ICD-10-CM    1. Major depressive disorder with single episode, remission status unspecified F32.9 buPROPion (WELLBUTRIN XL) 150 MG 24 hr tablet     FLUoxetine (PROZAC) 40 MG capsule   2. Anxiety F41.9 buPROPion (WELLBUTRIN XL) 150 MG 24 hr tablet     FLUoxetine (PROZAC) 40 MG capsule     Overall mood is fairly well controlled but still some underlying anxiety. Ongoing insomnia issues. Will trial Increase in fluoxetine, suggested taking in the morning to see if it helps with his sleep (he has been taking it at night). Follow-up in 6 weeks, pt will call to schedule.   Reviewed onset of action of meds, common side effects and plan for close f/u. Encouraged call to clinic if symptoms worsening or adverse reactions.        Patient Instructions     Start taking fluoxetine in the morning to see if it helps improve your " sleep.    Increase fluoxetine to 40 mg daily.    Don't drink caffeine after 10 am.    I encourage you to use a medicine box to keep track of your medications more easily.     At [facility], we strive to deliver an exceptional experience to you, every time we see you.  If you receive a survey in the mail, please send us back your thoughts. We really do value your feedback.    Your care team:     Family Medicine   BALAJI Salazar MD Emily Bunt, APRN CNP S. MD Trinh Arauz MD Angela Wermerskirchen, MD         Clinic hours: Monday - Wednesday 7 am-7 pm   Thursdays and Fridays 7 am-5 pm.     [facility]: Monday - Friday 11 am-9 pm,   Saturday and Sunday 9 am-5 pm.    [facility]: Monday -Thursday 8 am-8 pm; Friday 8 am-6 pm; Saturday and Sunday 9 am-5 pm.     [facility]: Monday - Thursday 8 am - 7 pm; Friday 8 am - 6 pm    Clinic: [phone]   [facility]: [phone]   [facility]: [phone]                Return in about 6 weeks (around 9/26/2019) for Medication check.  Length of visit was 16 minutes with more than 50 percent of that time used for discussing medical concerns and education    The information in this document, created by the medical scribe for me, accurately reflects the services I personally performed and the decisions made by me. I have reviewed and approved this document for accuracy.   Mallika Saavedra MD  [facility]

## 2019-08-16 ASSESSMENT — ANXIETY QUESTIONNAIRES: GAD7 TOTAL SCORE: 4

## 2019-10-10 ENCOUNTER — MYC MEDICAL ADVICE (OUTPATIENT)
Dept: FAMILY MEDICINE | Facility: CLINIC | Age: 24
End: 2019-10-10

## 2019-11-08 ENCOUNTER — HEALTH MAINTENANCE LETTER (OUTPATIENT)
Age: 24
End: 2019-11-08

## 2019-12-08 ENCOUNTER — DOCUMENTATION ONLY (OUTPATIENT)
Dept: FAMILY MEDICINE | Facility: CLINIC | Age: 24
End: 2019-12-08

## 2019-12-08 NOTE — PROGRESS NOTES
This patient has overdue labs. A [employer] message was sent on 10/10/2019 and a letter mailed out on 11/19/2019 and there has been no lab appointment made. If you still want these labs done, please have your care team contact the patient to make a lab appointment. Otherwise, please have the labs discontinued and close the encounter.    Thank you,    Ivonne Contreras MLT(ASCP)  [facility]

## 2019-12-19 ENCOUNTER — OFFICE VISIT (OUTPATIENT)
Dept: FAMILY MEDICINE | Facility: CLINIC | Age: 24
End: 2019-12-19
Payer: COMMERCIAL

## 2019-12-19 VITALS
OXYGEN SATURATION: 99 % | HEIGHT: 71 IN | DIASTOLIC BLOOD PRESSURE: 66 MMHG | SYSTOLIC BLOOD PRESSURE: 108 MMHG | HEART RATE: 79 BPM | WEIGHT: 148 LBS | TEMPERATURE: 97.8 F | BODY MASS INDEX: 20.72 KG/M2 | RESPIRATION RATE: 16 BRPM

## 2019-12-19 DIAGNOSIS — F32.9 MAJOR DEPRESSIVE DISORDER WITH SINGLE EPISODE, REMISSION STATUS UNSPECIFIED: ICD-10-CM

## 2019-12-19 DIAGNOSIS — F41.9 ANXIETY: ICD-10-CM

## 2019-12-19 DIAGNOSIS — Z11.3 SCREENING EXAMINATION FOR VENEREAL DISEASE: ICD-10-CM

## 2019-12-19 DIAGNOSIS — Z00.00 ROUTINE GENERAL MEDICAL EXAMINATION AT A HEALTH CARE FACILITY: Primary | ICD-10-CM

## 2019-12-19 PROCEDURE — 90651 9VHPV VACCINE 2/3 DOSE IM: CPT | Performed by: FAMILY MEDICINE

## 2019-12-19 PROCEDURE — 99395 PREV VISIT EST AGE 18-39: CPT | Mod: 25 | Performed by: FAMILY MEDICINE

## 2019-12-19 PROCEDURE — 90471 IMMUNIZATION ADMIN: CPT | Performed by: FAMILY MEDICINE

## 2019-12-19 ASSESSMENT — PATIENT HEALTH QUESTIONNAIRE - PHQ9
5. POOR APPETITE OR OVEREATING: MORE THAN HALF THE DAYS
SUM OF ALL RESPONSES TO PHQ QUESTIONS 1-9: 8

## 2019-12-19 ASSESSMENT — ANXIETY QUESTIONNAIRES
5. BEING SO RESTLESS THAT IT IS HARD TO SIT STILL: MORE THAN HALF THE DAYS
7. FEELING AFRAID AS IF SOMETHING AWFUL MIGHT HAPPEN: SEVERAL DAYS
IF YOU CHECKED OFF ANY PROBLEMS ON THIS QUESTIONNAIRE, HOW DIFFICULT HAVE THESE PROBLEMS MADE IT FOR YOU TO DO YOUR WORK, TAKE CARE OF THINGS AT HOME, OR GET ALONG WITH OTHER PEOPLE: SOMEWHAT DIFFICULT
3. WORRYING TOO MUCH ABOUT DIFFERENT THINGS: MORE THAN HALF THE DAYS
2. NOT BEING ABLE TO STOP OR CONTROL WORRYING: MORE THAN HALF THE DAYS
GAD7 TOTAL SCORE: 12
1. FEELING NERVOUS, ANXIOUS, OR ON EDGE: MORE THAN HALF THE DAYS
6. BECOMING EASILY ANNOYED OR IRRITABLE: SEVERAL DAYS

## 2019-12-19 ASSESSMENT — MIFFLIN-ST. JEOR: SCORE: 1679.48

## 2019-12-19 NOTE — PROGRESS NOTES
SUBJECTIVE:   CC: Terrell Young is an 24 year old male who presents for preventive health visit.     Healthy Habits:    Do you get at least three servings of calcium containing foods daily (dairy, green leafy vegetables, etc.)? No     Amount of exercise or daily activities, outside of work: 4-5 day(s) per week    Problems taking medications regularly No    Medication side effects: No    Have you had an eye exam in the past two years? no    Do you see a dentist twice per year? yes    Do you have sleep apnea, excessive snoring or daytime drowsiness?no    Mood   -Pt feels his mood is about the same as it was at his 08/15/2019 visit. He switched to taking fluoxetine in the morning which has helped him fall asleep. Also increased fluoxetine to 40 mg, has felt a little improvement in symptoms but nothing significant. Denies any side effects from this increase.   -He struggles with anxiety; pt thinks this is because he is starting school in Spring 2020 for criminal justice and he is looking for a job. He isn't sure that criminal justice is the right path for him. Denies significant depression, self harm thoughts   -He continues to limit his alcohol and caffeine consumption. Denies binge drinking.   -In the past he wouldn't eat because of stress, but he is eating more now and is exercising regularly.   Wt Readings from Last 4 Encounters:   12/19/19 67.1 kg (148 lb)   08/15/19 65.8 kg (145 lb)   11/23/18 67.1 kg (148 lb)   10/04/18 68.5 kg (151 lb)   -Pt states that he has always had a tremor at baseline that worsens when his anxiety is flared.     ADHD  -Has an appointment tomorrow for ADHD testing with Dr. Greco.   -His bother has ADHD and his father is going through testing, as well.       -Is sexually active, no concern for STD exposure since he was last screened at his last physical. Consistently uses condoms.     Additional  -Is not taking any supplements   -Gets nasal congestion in the fall time, when he wakes up  one of his nostrils will be clogged and improve during the day. He will take an anti-histamine when he feels significant allergy symptoms, but hasn't tried taking it for his congestion.   -Denies abdominal pain, reflux, headaches, hearing or vision changes     Today's PHQ-2 Score:   PHQ-2 ( 1999 Pfizer) 8/15/2019 10/4/2018   Q1: Little interest or pleasure in doing things 0 1   Q2: Feeling down, depressed or hopeless 0 1   PHQ-2 Score 0 2       Abuse: Current or Past(Physical, Sexual or Emotional)- No  Do you feel safe in your environment? Yes      Social History     Tobacco Use     Smoking status: Never Smoker     Smokeless tobacco: Never Used   Substance Use Topics     Alcohol use: Yes     Comment: yolanda drinking on weekends,  not drinking since anxiety tx.     If you drink alcohol do you typically have >3 drinks per day or >7 drinks per week? No                      Last PSA: No results found for: PSA    Reviewed orders with patient. Reviewed health maintenance and updated orders accordingly - Yes  Patient Active Problem List   Diagnosis     Major depressive disorder with single episode, remission status unspecified     Anxiety     Past Surgical History:   Procedure Laterality Date     NO HISTORY OF SURGERY         Social History     Tobacco Use     Smoking status: Never Smoker     Smokeless tobacco: Never Used   Substance Use Topics     Alcohol use: Yes     Comment: yolanda drinking on weekends,  not drinking since anxiety tx.     Family History   Problem Relation Age of Onset     Anxiety Disorder Mother      Heart Disease Maternal Grandfather         defibrillator     Attention Deficit Disorder Brother            Reviewed and updated as needed this visit by clinical staff  Tobacco  Allergies  Meds  Med Hx  Surg Hx  Fam Hx  Soc Hx        Reviewed and updated as needed this visit by Provider            ROS:   ROS: 10 point ROS neg other than the symptoms noted above in the HPI.    This document serves as a  "record of the services and decisions personally performed by DORIAN RAMIREZ. It was created on his/her behalf by Nayeli Rizzo, a trained medical scribe. The creation of this document is based on the provider's statements to the medical scribe. Nayeli Rizzo, December 19, 2019 2:13 PM    OBJECTIVE:   /66 (BP Location: Right arm, Patient Position: Sitting, Cuff Size: Adult Large)   Pulse 79   Temp 97.8  F (36.6  C) (Oral)   Resp 16   Ht 1.797 m (5' 10.75\")   Wt 67.1 kg (148 lb)   SpO2 99%   BMI 20.79 kg/m    EXAM:  GENERAL: healthy, alert and no distress  EYES: Eyes grossly normal to inspection, PERRL and conjunctivae and sclerae normal  HENT: ear canals and TM's normal, nose and mouth without ulcers or lesions  NECK: no adenopathy, no asymmetry, masses, or scars and thyroid normal to palpation  RESP: lungs clear to auscultation - no rales, rhonchi or wheezes  CV: regular rate and rhythm, normal S1 S2, no S3 or S4, no murmur, click or rub, no peripheral edema and peripheral pulses strong  ABDOMEN: soft, nontender, no hepatosplenomegaly, no masses and bowel sounds normal   (male): testicles normal without atrophy or masses, no hernias and penis normal without urethral discharge  MS: no gross musculoskeletal defects noted, no edema  SKIN: no suspicious lesions or rashes  NEURO: Normal strength and tone, mentation intact and speech normal, very fine tremor of arms and legs seen on exam   PSYCH: mentation appears normal, affect normal/bright    Diagnostic Test Results:  Labs reviewed in Epic    ASSESSMENT/PLAN:     1. Routine general medical examination at a health care facility  Pt will schedule fasting labs.   Discussed supplementing vitamin D and a daily antihistamine for nasal congestion.   HPV vaccine given today.   - Lipid panel reflex to direct LDL Fasting; Future  - Glucose; Future    2. Screening examination for venereal disease  Adjust therapy based on future labs  - HIV Screening; " "Future  - Neisseria gonorrhoeae PCR; Future  - Chlamydia trachomatis PCR; Future  - Hepatitis C antibody; Future  - Treponema Abs w Reflex to RPR and Titer; Future    3. Major depressive disorder with single episode, remission status unspecified  In process for adhd eval. Pt interested in increasing his fluoxetine but will wait until his ADHD assessment is complete to see if he is diagnosed or not as stimulant might be good next step. Follow-up in 6 months or sooner for adhd tx if needed.     4. Anxiety  As above.   - **TSH with free T4 reflex FUTURE anytime; Future    COUNSELING:  Reviewed preventive health counseling, as reflected in patient instructions    Estimated body mass index is 20.79 kg/m  as calculated from the following:    Height as of this encounter: 1.797 m (5' 10.75\").    Weight as of this encounter: 67.1 kg (148 lb).         reports that he has never smoked. He has never used smokeless tobacco.    Patient Instructions     Schedule a lab only visit to get your labs done. Be sure you are fasting 10-12 hours before your appointment.     I recommend a Vitamin D supplement. You can find it over the counter. Take 1000 international units daily.     I recommend taking an antihistamine, such as Claritin or Zyrtec, year round to see if it helps with your nasal congestion.    HPV vaccine given today, you will be due for a booster shot in 2 months and 6 months. We can catch up on these at your medication checks, as well.     At [facility], we strive to deliver an exceptional experience to you, every time we see you. If you receive a survey, please complete it as we do value your feedback.  If you have [employer], you can expect to receive results automatically within 24 hours of their completion.  Your provider will send a note interpreting your results as well.   If you do not have MyChart, you should receive your results in about a week by mail.    Your care team:     Family Medicine "   BALAJI Salazar MD Emily Bunt, APRN [facility]   S. MD Trinh Arauz MD Angela Wermerskirchen, MD    Internal Medicine  Ciaran Bernal MD coming 2020     Clinic hours: Monday - Wednesday 7 am-7 pm   Thursdays and Fridays 7 am-5 pm.     [facility]: Monday - Friday 11 am-9 pm,   Saturday and Sunday 9 am-5 pm.    [facility]: Monday -Thursday 8 am-8 pm; Friday 8 am-6 pm; Saturday and Sunday 9 am-5 pm.     [facility]: Monday - Thursday 8 am - 7 pm; Friday 8 am - 6 pm    Clinic: [phone]   [facility]: [phone] [facility]: [phone]            Preventive Health Recommendations  Male Ages 21 - 25     Yearly exam:             See your health care provider every year in order to  o   Review health changes.   o   Discuss preventive care.    o   Review your medicines if your doctor has prescribed any.    You should be tested each year for STDs (sexually transmitted diseases).     Talk to your provider about cholesterol testing.      If you are at risk for diabetes, you should have a diabetes test (fasting glucose).    Shots: Get a flu shot each year. Get a tetanus shot every 10 years.     Nutrition:    Eat at least 5 servings of fruits and vegetables daily.     Eat whole-grain bread, whole-wheat pasta and brown rice instead of white grains and rice.     Get adequate calcium and Vitamin D.     Lifestyle    Exercise for at least 150 minutes a week (30 minutes a day, 5 days a week). This will help you control your weight and prevent disease.     Limit alcohol to one drink per day.     No smoking.     Wear sunscreen to prevent skin cancer.     See your dentist every six months for an exam and cleaning.     Counseling Resources:  ATP IV Guidelines  Pooled Cohorts Equation Calculator  FRAX Risk Assessment  ICSI Preventive Guidelines  Dietary Guidelines for Americans,  2010  USDA's MyPlate  ASA Prophylaxis  Lung CA Screening    The information in this document, created by the medical scribe for me, accurately reflects the services I personally performed and the decisions made by me. I have reviewed and approved this document for accuracy.   Mallika Saavedra MD  [facility]

## 2019-12-19 NOTE — PATIENT INSTRUCTIONS
Schedule a lab only visit to get your labs done. Be sure you are fasting 10-12 hours before your appointment.     I recommend a Vitamin D supplement. You can find it over the counter. Take 1000 international units daily.     I recommend taking an antihistamine, such as Claritin or Zyrtec, year round to see if it helps with your nasal congestion.    HPV vaccine given today, you will be due for a booster shot in 2 months and 6 months. We can catch up on these at your medication checks, as well.     At [facility] [place], we strive to deliver an exceptional experience to you, every time we see you. If you receive a survey, please complete it as we do value your feedback.  If you have MyChart, you can expect to receive results automatically within 24 hours of their completion.  Your provider will send a note interpreting your results as well.   If you do not have MyChart, you should receive your results in about a week by mail.    Your care team:     Family Medicine   BALAJI Salazar MD Emily Bunt, APRN CNP S. MD Trinh Arauz MD Angela Wermerskirchen, MD    Internal Medicine  Ciaran Bernal MD coming 2020     Clinic hours: Monday - Wednesday 7 am-7 pm   Thursdays and Fridays 7 am-5 pm.     [facility]: Monday - Friday 11 am-9 pm,   Saturday and Sunday 9 am-5 pm.    [facility]: Monday -Thursday 8 am-8 pm; Friday 8 am-6 pm; Saturday and Sunday 9 am-5 pm.     [facility]: Monday - Thursday 8 am - 7 pm; Friday 8 am - 6 pm    Clinic: [phone]   [facility]: [phone]   [facility]: [phone]            Preventive Health Recommendations  Male Ages 21 - 25     Yearly exam:             See your health care provider every year in order to  o   Review health changes.   o   Discuss preventive care.    o   Review your medicines if your doctor has  prescribed any.    You should be tested each year for STDs (sexually transmitted diseases).     Talk to your provider about cholesterol testing.      If you are at risk for diabetes, you should have a diabetes test (fasting glucose).    Shots: Get a flu shot each year. Get a tetanus shot every 10 years.     Nutrition:    Eat at least 5 servings of fruits and vegetables daily.     Eat whole-grain bread, whole-wheat pasta and brown rice instead of white grains and rice.     Get adequate calcium and Vitamin D.     Lifestyle    Exercise for at least 150 minutes a week (30 minutes a day, 5 days a week). This will help you control your weight and prevent disease.     Limit alcohol to one drink per day.     No smoking.     Wear sunscreen to prevent skin cancer.     See your dentist every six months for an exam and cleaning.

## 2019-12-19 NOTE — NURSING NOTE
Prior to immunization administration, verified patients identity using patient s name and date of birth. Please see Immunization Activity for additional information.     Screening Questionnaire for Adult Immunization    Are you sick today?   No   Do you have allergies to medications, food, a vaccine component or latex?   No   Have you ever had a serious reaction after receiving a vaccination?   No   Do you have a long-term health problem with heart, lung, kidney, or metabolic disease (e.g., diabetes), asthma, a blood disorder, no spleen, complement component deficiency, a cochlear implant, or a spinal fluid leak?  Are you on long-term aspirin therapy?   No   Do you have cancer, leukemia, HIV/AIDS, or any other immune system problem?   No   Do you have a parent, brother, or sister with an immune system problem?   No   In the past 3 months, have you taken medications that affect  your immune system, such as prednisone, other steroids, or anticancer drugs; drugs for the treatment of rheumatoid arthritis, Crohn s disease, or psoriasis; or have you had radiation treatments?   No   Have you had a seizure, or a brain or other nervous system problem?   No   During the past year, have you received a transfusion of blood or blood    products, or been given immune (gamma) globulin or antiviral drug?   No   For women: Are you pregnant or is there a chance you could become       pregnant during the next month?   No   Have you received any vaccinations in the past 4 weeks?   No     Immunization questionnaire answers were all negative.        Patient instructed to remain in clinic for 15 minutes afterwards, and to report any adverse reaction to me immediately.       Screening performed by Zafar Sanchez on 12/19/2019 at 2:51 PM.

## 2019-12-20 ENCOUNTER — OFFICE VISIT (OUTPATIENT)
Dept: PSYCHOLOGY | Facility: CLINIC | Age: 24
End: 2019-12-20
Payer: COMMERCIAL

## 2019-12-20 ENCOUNTER — DOCUMENTATION ONLY (OUTPATIENT)
Dept: PSYCHOLOGY | Facility: CLINIC | Age: 24
End: 2019-12-20
Payer: COMMERCIAL

## 2019-12-20 DIAGNOSIS — Z00.00 ROUTINE GENERAL MEDICAL EXAMINATION AT A HEALTH CARE FACILITY: ICD-10-CM

## 2019-12-20 DIAGNOSIS — F33.1 MAJOR DEPRESSIVE DISORDER, RECURRENT EPISODE, MODERATE (H): ICD-10-CM

## 2019-12-20 DIAGNOSIS — F41.9 ANXIETY: ICD-10-CM

## 2019-12-20 DIAGNOSIS — Z11.3 SCREENING EXAMINATION FOR VENEREAL DISEASE: ICD-10-CM

## 2019-12-20 DIAGNOSIS — F41.1 GENERALIZED ANXIETY DISORDER: Primary | ICD-10-CM

## 2019-12-20 LAB
CHOLEST SERPL-MCNC: 160 MG/DL
GLUCOSE SERPL-MCNC: 104 MG/DL (ref 70–99)
HDLC SERPL-MCNC: 60 MG/DL
LDLC SERPL CALC-MCNC: 90 MG/DL
NONHDLC SERPL-MCNC: 100 MG/DL
TRIGL SERPL-MCNC: 49 MG/DL
TSH SERPL DL<=0.005 MIU/L-ACNC: 0.68 MU/L (ref 0.4–4)

## 2019-12-20 PROCEDURE — 96136 PSYCL/NRPSYC TST PHY/QHP 1ST: CPT | Performed by: PSYCHOLOGIST

## 2019-12-20 PROCEDURE — 84443 ASSAY THYROID STIM HORMONE: CPT | Performed by: FAMILY MEDICINE

## 2019-12-20 PROCEDURE — 87389 HIV-1 AG W/HIV-1&-2 AB AG IA: CPT | Performed by: FAMILY MEDICINE

## 2019-12-20 PROCEDURE — 99207 ZZC NO CHARGE LOS: CPT | Performed by: PSYCHOLOGIST

## 2019-12-20 PROCEDURE — 96130 PSYCL TST EVAL PHYS/QHP 1ST: CPT | Performed by: PSYCHOLOGIST

## 2019-12-20 PROCEDURE — 86780 TREPONEMA PALLIDUM: CPT | Performed by: FAMILY MEDICINE

## 2019-12-20 PROCEDURE — 80061 LIPID PANEL: CPT | Performed by: FAMILY MEDICINE

## 2019-12-20 PROCEDURE — 86803 HEPATITIS C AB TEST: CPT | Performed by: FAMILY MEDICINE

## 2019-12-20 PROCEDURE — 87591 N.GONORRHOEAE DNA AMP PROB: CPT | Performed by: FAMILY MEDICINE

## 2019-12-20 PROCEDURE — 82947 ASSAY GLUCOSE BLOOD QUANT: CPT | Performed by: FAMILY MEDICINE

## 2019-12-20 PROCEDURE — 87491 CHLMYD TRACH DNA AMP PROBE: CPT | Performed by: FAMILY MEDICINE

## 2019-12-20 PROCEDURE — 96137 PSYCL/NRPSYC TST PHY/QHP EA: CPT | Performed by: PSYCHOLOGIST

## 2019-12-20 PROCEDURE — 36415 COLL VENOUS BLD VENIPUNCTURE: CPT | Performed by: FAMILY MEDICINE

## 2019-12-20 ASSESSMENT — ANXIETY QUESTIONNAIRES: GAD7 TOTAL SCORE: 12

## 2019-12-20 NOTE — PROGRESS NOTES
[facility]    Wechsler Adult Intelligence Scale--Fourth Edition (WAIS-IV)  Client s intellectual functioning was assessed using the WAIS-IV. This measure provides a Full Scale Score, as well as several index scores measuring specific areas of cognitive ability. Index scores are reported using standard scores which have a mean of 100 and a standard deviation of 15. Subtest scores are reported using scaled scores that have a mean of 10 and a standard deviation of 2. Client s scores are reported at the 95 percent confidence interval, which indicates that there is a 95 percent chance that his true score falls within the stated range.  Results indicate that the FSIQ, a measure of overall intelligence, is the best descriptor of the client s functioning. The client's score on the FSIQ is in the High Average range (95% chance 106-114; 75th percentile).     Client s score on the Verbal Comprehension Index (VCI), a measure of verbal concept formation, verbal reasoning, and acquired knowledge, was in the High Average range (95% chance 106-117; 79th percentile). Client s score on the Perceptual Reasoning Index (MARYANNE), a measure of perceptual and fluid reasoning, spatial processing, and visual-motor integration, was in the Average range (95% chance 100-113; 68th  percentile). Client scored in the Average range (95% chance ; 63rd percentile) on the Working Memory Index (WMI), a measure of ability to retain information in memory, perform some operation or manipulation, and produce a result. Client s score on the Processing Speed Index (PSI), a measure of short-term visual memory, attention, and visual-motor coordination was in the Average range (95% chance ; 63rd percentile). Overall, Client is functioning in the Average to High Average range with no areas of deficit or weakness that would be expected if he had ADHD. His working memory and processing speed indices are commensurate with his other  abilities.    Summary of WAIS-IV Index Scores   Index  Score Percentile Rank Confidence  Interval* Qualitative Description   Verbal Comprehension Index (VCI) 112 79 106-117 High Average   Perceptual Reasoning Index (MARYANNE) 107 68 100-113 Average   Working Memory Index (WMI) 105 63  Average   Processing Speed Index (PSI) 105 63  Average   Full Scale IQ (FSIQ) 110 75 106-114 High Average     Summary of WAIS-IV Scaled Subtest Scores  Verbal Comprehension Perceptual Reasoning   Similarities 13 Block Design 13   Vocabulary 12 Matrix Reasoning 12   Information 12 Visual Puzzles 9   Working Memory Processing Speed   Digit Span 13 Coding 9   Arithmetic 9 Symbol Search 13   *Confidence intervals at 95th percentile      Ana Maria Greco, PhD, LP

## 2019-12-20 NOTE — PROGRESS NOTES
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 such as CBT or DBT, are particularly recommended for the treatment of chronic depression and anxiety.    3. The use of behavioral strategies such as diaphragmatic breathing, guided imagery, and mindfulness is often helpful in the management of anxiety and depressive symptoms.     4. The following compensatory strategies may be useful to cope with reported inattention symptoms:   a. Maintaining a predictable routine and structured environment that incorporates prioritized checklists and reminders (e.g., Post-Its).  b. When completing tasks, try to focus on one task at a time and complete it in its entirety before moving on to the next task.  c. Minimize background distractions when working on complex tasks. For example, TV, radio or ongoing conversations in the background may hinder ability to focus on the task at hand.  d. Take regular breaks from tasks that require prolonged attention. In general, regular breaks from complex tasks can help prevent lapses in attention, which can result in errors.  e. Outline the steps required to complete a task prior to beginning it, which can help ensure an organized approach. Use the outline to refer to throughout the task as a reminder of the steps to be completed.    5. If the anxiety and depression are adequately treated and you  continue to have difficulty with inattention and hyperactivity/impulsivity, a re-evaluation may be warranted.       Ana Maria Greco, PhD, LP  Licensed Psychologist

## 2019-12-21 LAB — T PALLIDUM AB SER QL: NONREACTIVE

## 2019-12-22 DIAGNOSIS — F32.9 MAJOR DEPRESSIVE DISORDER WITH SINGLE EPISODE, REMISSION STATUS UNSPECIFIED: ICD-10-CM

## 2019-12-22 DIAGNOSIS — F41.9 ANXIETY: ICD-10-CM

## 2019-12-22 LAB
HCV AB SERPL QL IA: NONREACTIVE
HIV 1+2 AB+HIV1 P24 AG SERPL QL IA: NONREACTIVE

## 2019-12-23 NOTE — TELEPHONE ENCOUNTER
"Requested Prescriptions   Pending Prescriptions Disp Refills     FLUoxetine (PROZAC) 40 MG capsule [Pharmacy Med Name: FLUoxetine HCl Oral Capsule 40 MG] 30 capsule 1     Sig: Take 1 capsule by mouth daily       SSRIs Protocol Passed - 12/22/2019  7:04 AM        Passed - PHQ-9 score less than 5 in past 6 months     Please review last PHQ-9 score.           Passed - Medication is active on med list        Passed - Patient is age 18 or older        Passed - Recent (6 mo) or future (30 days) visit within the authorizing provider's specialty     Patient had office visit in the last 6 months or has a visit in the next 30 days with authorizing provider or within the authorizing provider's specialty.  See \"Patient Info\" tab in inbasket, or \"Choose Columns\" in Meds & Orders section of the refill encounter.            FLUoxetine (PROZAC) 40 MG capsule  Last Written Prescription Date:  8/15/19  Last Fill Quantity: 30,  # refills: 2   Last office visit: 12/19/2019 with prescribing provider:  Dr. Saavedra   Future Office Visit:   Next 5 appointments (look out 90 days)    Dec 30, 2019  9:00 AM CST  Return Visit with Ana Maria Greco, PhD  [facility] ([facility]) [address] [address] [place] [id number] [phone]           PHQ-9 score:    PHQ-9 SCORE 12/19/2019   PHQ-9 Total Score Tonyhart -   PHQ-9 Total Score 8             SCAR-7 SCORE 2/18/2019 8/15/2019 12/19/2019   Total Score 7 (mild anxiety) - -   Total Score 7 4 12         "

## 2019-12-26 RX ORDER — FLUOXETINE 40 MG/1
CAPSULE ORAL
Qty: 90 CAPSULE | Refills: 1 | Status: SHIPPED | OUTPATIENT
Start: 2019-12-26 | End: 2020-10-27

## 2019-12-26 NOTE — TELEPHONE ENCOUNTER
Routing refill request to provider for review/approval because:  Out of range:  PHQ-9 >4  Rosa Isela Dickey RN  [facility]

## 2019-12-30 ENCOUNTER — OFFICE VISIT (OUTPATIENT)
Dept: PSYCHOLOGY | Facility: CLINIC | Age: 24
End: 2019-12-30
Payer: COMMERCIAL

## 2019-12-30 DIAGNOSIS — F33.1 MAJOR DEPRESSIVE DISORDER, RECURRENT EPISODE, MODERATE (H): ICD-10-CM

## 2019-12-30 DIAGNOSIS — F41.1 GENERALIZED ANXIETY DISORDER: Primary | ICD-10-CM

## 2019-12-30 PROCEDURE — 90832 PSYTX W PT 30 MINUTES: CPT | Performed by: PSYCHOLOGIST

## 2019-12-30 NOTE — PROGRESS NOTES
Client Name: Terrell Young Date: 12/30/2019     Service Type: Individual (ADHD Evaluation feedback session)     Session Start Time: 9:10 Session End Time: 9:40      Session Length: 30 minutes      Session #: (feedback)     Attendees: Client attended alone        DATA        Treatment Objective(s) Addressed in This Session:   Provided feedback on ADHD evaluation. Reviewed test results in depth and answered client's questions. Client diagnosed with Major Depressive Disorder, Recurrent Episode, Moderate  and Generalized Anxiety Disorder. This provider also completed full written report of evaluation, including integration of testing data, summary, and recommendations. Please see Documentation Only dated 12/20/19.     Progress on / Status of Treatment Objective(s) / Homework:   Completed      Intervention:  ADHD Evaluation feedback; Reviewed report (can be found in Documentation Only encounter dated 12/20/19); Client was appreciative of the feedback and expressed understanding of the diagnoses. He expressed interest in meeting with his PCP to discuss medication management (he believed they may be planning to increase his anxiety medication).     ASSESSMENT: Current Emotional / Mental Status (status of significant symptoms):  Risk status (Self / Other harm or suicidal ideation)  Client denies current fears or concerns for personal safety.  Client denies current or recent suicidal ideation or behaviors.  Client denies current or recent homicidal ideation or behaviors.  Client denies current or recent self-injurious behavior or ideation.  Client denies other safety concerns.  A safety and risk management plan has not been developed at this time, however client was given the after-hours number / 911 should there be a change in any of these risk factors.     Appearance: Appropriate   Eye Contact: Good   Psychomotor Behavior: Normal   Attitude: Cooperative   Orientation: All  Speech  Rate / Production: Normal   Volume: Normal    Mood: Normal  Affect: Appropriate   Thought Content: Clear   Thought Form: Coherent Logical   Insight: Good      Medication Review:  Client is prescribed Wellbutrin and Prozac by PCP    Medication Compliance:  Yes     Changes in Health Issues:  None reported     Chemical Use Review:  Substance Use: Chemical use reviewed, no active concerns identified      Tobacco Use: No current tobacco use.      Collateral Reports Completed:  Routed note to Care Team Member(s)     PLAN: (Homework, other)     Recommendations:    1. Schedule an appointment with your physician or psychiatrist to discuss a medication evaluation. Medications are often beneficial in treating anxiety and depressive symptoms. It is important to take your medication on a consistent basis.    2. Individual therapy is recommended. Therapies focused on identifying and challenging problematic thought and behavior patterns while increasing the use of healthy coping skills has been found to be effective in treating anxiety and depression. It will be important to set goals in this therapy and work actively toward achieving short-term successes that lead to the completion of each goal. Action-oriented therapies, such as CBT or DBT, are particularly recommended for the treatment of chronic depression and anxiety.    3. The use of behavioral strategies such as diaphragmatic breathing, guided imagery, and mindfulness is often helpful in the management of anxiety and depressive symptoms.     4. The following compensatory strategies may be useful to cope with reported inattention symptoms:   a. Maintaining a predictable routine and structured environment that incorporates prioritized checklists and reminders (e.g., Post-Its).  b. When completing tasks, try to focus on one task at a time and complete it in its entirety before moving on to the next task.  c. Minimize background distractions when working on complex tasks. For example, TV, radio or ongoing conversations  in the background may hinder ability to focus on the task at hand.  d. Take regular breaks from tasks that require prolonged attention. In general, regular breaks from complex tasks can help prevent lapses in attention, which can result in errors.  e. Outline the steps required to complete a task prior to beginning it, which can help ensure an organized approach. Use the outline to refer to throughout the task as a reminder of the steps to be completed.    5. If the anxiety and depression are adequately treated and you continue to have difficulty with inattention and hyperactivity/impulsivity, a re-evaluation may be warranted.       Ana Maria Greco, PhD, LP  Licensed Psychologist

## 2020-02-10 ENCOUNTER — FCC EXTENDED DOCUMENTATION (OUTPATIENT)
Dept: PSYCHOLOGY | Facility: CLINIC | Age: 25
End: 2020-02-10

## 2020-02-10 DIAGNOSIS — F41.1 GENERALIZED ANXIETY DISORDER: Primary | ICD-10-CM

## 2020-02-10 NOTE — PROGRESS NOTES
Discharge Summary  Multiple Sessions    Client Name: Terrell Young MRN#: [id number] YOB: 1995      Intake / Discharge Date: 9/18/18 - 11/6/18  2 sessions    DSM5 Diagnoses:   Diagnoses: 296.31 (F33.0) Major Depressive Disorder, Recurrent Episode, Mild _ and With melancholic features  300.02 (F41.1) Generalized Anxiety Disorder  Psychosocial & Contextual Factors: school challenges, lack of direction  WHODAS: 22 at intake          Presenting Concern:  Client reports the reason for seeking therapy at this time as having anxiety and depression that has gotten worse and affecting school. He feels lack of direction and purpose in life.    Reason for Discharge:  Referred to Ana Maria Greco for testing and she did therapy      Disposition at Time of Last Encounter:   Comments:   Improving - he feels like meds are bringing down his anxiety and he is more calm. Only one panic attack     Risk Management:   Client  denies a history of suicidal ideation, suicide attempts, self-injurious behavior, homicidal ideation, homicidal behavior and and other safety concerns  Recommended that patient call 911 or go to the local ED should there be a change in any of these risk factors.      Referred To:  Get ADD testing and continue therapy.      Guerda Rogers, AMAURY   2/10/2020

## 2020-10-27 ENCOUNTER — MYC MEDICAL ADVICE (OUTPATIENT)
Dept: FAMILY MEDICINE | Facility: CLINIC | Age: 25
End: 2020-10-27

## 2020-10-27 ENCOUNTER — VIRTUAL VISIT (OUTPATIENT)
Dept: FAMILY MEDICINE | Facility: CLINIC | Age: 25
End: 2020-10-27
Payer: COMMERCIAL

## 2020-10-27 DIAGNOSIS — F32.9 MAJOR DEPRESSIVE DISORDER WITH SINGLE EPISODE, REMISSION STATUS UNSPECIFIED: Primary | ICD-10-CM

## 2020-10-27 DIAGNOSIS — F41.9 ANXIETY: ICD-10-CM

## 2020-10-27 PROCEDURE — 96127 BRIEF EMOTIONAL/BEHAV ASSMT: CPT | Mod: TEL | Performed by: PHYSICIAN ASSISTANT

## 2020-10-27 PROCEDURE — 99214 OFFICE O/P EST MOD 30 MIN: CPT | Mod: TEL | Performed by: PHYSICIAN ASSISTANT

## 2020-10-27 RX ORDER — BUPROPION HYDROCHLORIDE 150 MG/1
150 TABLET ORAL EVERY MORNING
Qty: 90 TABLET | Refills: 0 | Status: SHIPPED | OUTPATIENT
Start: 2020-10-27 | End: 2021-02-16

## 2020-10-27 RX ORDER — FLUOXETINE 40 MG/1
40 CAPSULE ORAL DAILY
Qty: 90 CAPSULE | Refills: 0 | Status: SHIPPED | OUTPATIENT
Start: 2020-10-27 | End: 2021-02-16

## 2020-10-27 ASSESSMENT — PATIENT HEALTH QUESTIONNAIRE - PHQ9: SUM OF ALL RESPONSES TO PHQ QUESTIONS 1-9: 14

## 2020-10-27 NOTE — PROGRESS NOTES
"     Terrell Young is a 24 year old male who is being evaluated via a billable telephone visit.      The patient has been notified of following:     \"This telephone visit will be conducted via a call between you and your physician/provider. We have found that certain health care needs can be provided without the need for a physical exam.  This service lets us provide the care you need with a short phone conversation.  If a prescription is necessary we can send it directly to your pharmacy.  If lab work is needed we can place an order for that and you can then stop by our lab to have the test done at a later time.    Telephone visits are billed at different rates depending on your insurance coverage. During this emergency period, for some insurers they may be billed the same as an in-person visit.  Please reach out to your insurance provider with any questions.    If during the course of the call the physician/provider feels a telephone visit is not appropriate, you will not be charged for this service.\"    Patient has given verbal consent for Telephone visit?  Yes    What phone number would you like to be contacted at? mobile    How would you like to obtain your AVS? Agueda Zurita     Terrell Young is a 24 year old male who presents via phone visit today for the following health issues:    HPI      With the current happenings in the world and the turning of the seasons, I'm finding myself once again struggling with some symptoms of anxiety. The medications before helped to handle the symptoms, but with some major changes coming in the near future, I feel my symptoms beginning to ramp up once again and I'd very much like to try and get ahold of them before they can cause disruption again  , had seen a  Counsellor and been on prozac and wellbutrin in the past. sony brooke to fhte same symptoms he had last year with moodd issues. declined counseling this time.             Review of Systems   CONSTITUTIONAL: " NEGATIVE for fever, chills, change in weight  RESP: NEGATIVE for significant cough or SOB  PSYCHIATRIC: depressed mood       Objective          Vitals:  No vitals were obtained today due to virtual visit.    healthy and no distress  PSYCH: Alert and oriented times 3; coherent speech, normal   rate and volume, able to articulate logical thoughts, able   to abstract reason, no tangential thoughts, no hallucinations   or delusions  His affect is normal  RESP: No cough, no audible wheezing, able to talk in full sentences  Remainder of exam unable to be completed due to telephone visits           Assessment/Plan:sounds well, PHQ high for him but he reports good supports and is trying to get ahead of it.      Assessment & Plan   Problem List Items Addressed This Visit     Major depressive disorder with single episode, remission status unspecified - Primary    Relevant Medications    buPROPion (WELLBUTRIN XL) 150 MG 24 hr tablet    FLUoxetine (PROZAC) 40 MG capsule    Other Relevant Orders    EMOTIONAL / BEHAVIORAL ASSESSMENT (Completed)    Anxiety    Relevant Medications    buPROPion (WELLBUTRIN XL) 150 MG 24 hr tablet    FLUoxetine (PROZAC) 40 MG capsule    Other Relevant Orders    EMOTIONAL / BEHAVIORAL ASSESSMENT (Completed)                  Return in about 2 months (around 12/27/2020) for Virtual Visit.    DONY Wong  [facility]    Phone call duration:  7 minutes

## 2020-10-27 NOTE — PATIENT INSTRUCTIONS
Patient Education     Depression  Depression is one of the most common mental health problems today. It is not just a state of unhappiness or sadness. It is a true disease. The cause seems to be related to a decrease in chemicals that transmit signals in the brain. Having a family history of depression, alcoholism, or suicide increases the risk. Chronic illness, chronic pain, migraine headaches, and high emotional stress also increase the risk.  Depression is something we tend to recognize in others, but may have a hard time seeing in ourselves. It can show in many physical and emotional ways:    Loss of appetite    Overeating    Not being able to sleep    Sleeping too much    Tiredness not related to physical exertion    Restlessness or irritability    Slowness of movement or speech    Feeling depressed or withdrawn    Loss of interest in things you once enjoyed    Trouble concentrating, poor memory, trouble making decisions    Thoughts of harming or killing oneself, or thoughts that life is not worth living    Low self-esteem  The treatment for depression may include both medicine and psychotherapy. Antidepressants can reduce suffering and can improve the ability to function during the depressed period. Therapy can offer emotional support and help you understand emotional factors that may be causing the depression.  Home care    Ongoing care and support help people manage this disease. Find a healthcare provider and therapist who meet your needs. Seek help when you feel like you may be getting ill.    Be kind to yourself. Make it a point to do things that you enjoy (gardening, walking in nature, going to a movie). Reward yourself for small successes.    Take care of your physical body. Eat a balanced diet (low in saturated fat and high in fruits and vegetables). Exercise at least 3 times a week for 30 minutes. Even mild-moderate exercise (like brisk walking) can make you feel better.    Don't drink alcohol,  which can make depression worse.    Take medicine as prescribed.    Tell each of your healthcare providers about all of the prescription and over-the-counter medicines, vitamins, and supplements you take. Certain supplements interact with medicines and can result in dangerous side effects. Ask your pharmacist when you have questions about medicine interactions.    Talk with your family and trusted friends about your feelings and thoughts. Ask them to help you recognize behavior changes early so you can get help and, if needed, medicine can be adjusted.    Follow-up care  Follow up with your healthcare provider, or as advised.  Call 911  Call 911 if you:    Have suicidal thoughts, a suicide plan, and the means to carry out the plan; or serious thoughts of hurting someone else     Have trouble breathing    Are very confused    Feel very drowsy or have trouble awakening    Faint or lose consciousness    Have new chest pain that becomes more severe, lasts longer, or spreads into your shoulder, arm, neck, jaw, or back  When to seek medical advice  Call your healthcare provider right away if any of these happen:    Feeling extreme depression, fear, anxiety, or anger toward yourself or others    Feeling out of control    Feeling that you may try to harm yourself or another    Hearing voices that others do not hear    Seeing things that others do not see    Can t sleep or eat for 3 days in a row    Friends or family express concern over your behavior and ask you to seek help  Date Last Reviewed: 10/1/2017    5457-2751 [employer]. [address] [place] [address]. All rights reserved. This information is not intended as a substitute for professional medical care. Always follow your healthcare professional's instructions.           Patient Education     Anxiety Reaction  Anxiety is the feeling we all get when we think something bad might happen. It is a normal response to stress and usually causes only  a mild reaction. When anxiety becomes more severe, it can interfere with daily life. In some cases, you may not even be aware of what it is you re anxious about. There may also be a genetic link or it may be a learned behavior in the home.  Both psychological and physical triggers cause stress reaction. It's often a response to fear or emotional stress, real or imagined. This stress may come from home, family, work, or social relationships.  During an anxiety reaction, you may feel:    Helpless    Nervous    Depressed    Irritable  Your body may show signs of anxiety in many ways. You may experience:    Dry mouth    Shakiness    Dizziness    Weakness    Trouble breathing    Breathing fast (hyperventilating)    Chest pressure    Sweating    Headache    Nausea    Diarrhea    Tiredness    Inability to sleep    Sexual problems  Home care    Try to locate the sources of stress in your life. They may not be obvious. These may include:  ? Daily hassles of life (such as traffic jams, missed appointments, or car troubles)  ? Major life changes, both good (new baby or job promotion) and bad (loss of job or loss of loved one)  ? Overload: feeling that you have too many responsibilities and can't take care of all of them at once  ? Feeling helpless or feeling that your problems are beyond what you re able to solve    Notice how your body reacts to stress. Learn to listen to your body signals. This will help you take action before the stress becomes severe.    When you can, do something about the source of your stress. (Avoid hassles, limit the amount of change that happens in your life at one time and take a break when you feel overloaded).    Unfortunately, many stressful situations can't be avoided. It is necessary to learn how to better manage stress. There are many proven methods that will reduce your anxiety. These include simple things like exercise, good nutrition, and adequate rest. Also, there are certain techniques  that are helpful:  ? Relaxation  ? Breathing exercises  ? Visualization  ? Biofeedback  ? Meditation  For more information about this, consult your healthcare provider or go to a local bookstore and review the many books and tapes available on this subject.  Follow-up care  If you feel that your anxiety is not responding to self-help measures, contact your healthcare provider or make an appointment with a counselor. You may need short-term psychological counseling and temporary medicine to help you manage stress.  Call 911  Call 911 if any of these happen:    Trouble breathing    Confusion    Drowsiness or trouble wakening    Fainting or loss of consciousness    Rapid heart rate    Seizure    New chest pain that becomes more severe, lasts longer, or spreads into your shoulder, arm, neck, jaw, or back  When to seek medical advice  Call your healthcare provider right away if any of these happen:    Your symptoms get worse    Severe headache not relieved by rest and mild pain reliever  Date Last Reviewed: 10/1/2017    2508-9668 [employer]. [address] [place] [address], [place] [address]. All rights reserved. This information is not intended as a substitute for professional medical care. Always follow your healthcare professional's instructions.

## 2020-12-06 ENCOUNTER — HEALTH MAINTENANCE LETTER (OUTPATIENT)
Age: 25
End: 2020-12-06

## 2021-02-20 ENCOUNTER — HEALTH MAINTENANCE LETTER (OUTPATIENT)
Age: 26
End: 2021-02-20

## 2021-06-08 ENCOUNTER — MYC REFILL (OUTPATIENT)
Dept: FAMILY MEDICINE | Facility: CLINIC | Age: 26
End: 2021-06-08

## 2021-06-08 DIAGNOSIS — F41.9 ANXIETY: ICD-10-CM

## 2021-06-08 DIAGNOSIS — F32.9 MAJOR DEPRESSIVE DISORDER WITH SINGLE EPISODE, REMISSION STATUS UNSPECIFIED: ICD-10-CM

## 2021-06-08 RX ORDER — BUPROPION HYDROCHLORIDE 150 MG/1
150 TABLET ORAL EVERY MORNING
Qty: 30 TABLET | Refills: 0 | Status: SHIPPED | OUTPATIENT
Start: 2021-06-08 | End: 2022-02-07

## 2021-06-08 RX ORDER — FLUOXETINE 40 MG/1
40 CAPSULE ORAL DAILY
Qty: 30 CAPSULE | Refills: 0 | Status: SHIPPED | OUTPATIENT
Start: 2021-06-08 | End: 2022-02-07

## 2021-06-08 NOTE — TELEPHONE ENCOUNTER
"Routing refill request to provider for review/approval because:  Requested Prescriptions   Pending Prescriptions Disp Refills    buPROPion (WELLBUTRIN XL) 150 MG 24 hr tablet 90 tablet 0     Sig: Take 1 tablet (150 mg) by mouth every morning       SSRIs Protocol Failed - 6/8/2021 11:07 AM        Failed - PHQ-9 score less than 5 in past 6 months     Please review last PHQ-9 score.           Failed - Recent (6 mo) or future (30 days) visit within the authorizing provider's specialty     Patient had office visit in the last 6 months or has a visit in the next 30 days with authorizing provider or within the authorizing provider's specialty.  See \"Patient Info\" tab in inbasket, or \"Choose Columns\" in Meds & Orders section of the refill encounter.            Passed - Medication is Bupropion     If the medication is Bupropion (Wellbutrin), and the patient is taking for smoking cessation; OK to refill.          Passed - Medication is active on med list        Passed - Patient is age 18 or older          FLUoxetine (PROZAC) 40 MG capsule 90 capsule 0     Sig: Take 1 capsule (40 mg) by mouth daily       SSRIs Protocol Failed - 6/8/2021 11:07 AM        Failed - PHQ-9 score less than 5 in past 6 months     Please review last PHQ-9 score.           Failed - Recent (6 mo) or future (30 days) visit within the authorizing provider's specialty     Patient had office visit in the last 6 months or has a visit in the next 30 days with authorizing provider or within the authorizing provider's specialty.  See \"Patient Info\" tab in inbasket, or \"Choose Columns\" in Meds & Orders section of the refill encounter.            Passed - Medication is Bupropion     If the medication is Bupropion (Wellbutrin), and the patient is taking for smoking cessation; OK to refill.          Passed - Medication is active on med list        Passed - Patient is age 18 or older           PHQ 8/15/2019 12/19/2019 10/27/2020   PHQ-9 Total Score 3 8 14   Q9: " Thoughts of better off dead/self-harm past 2 weeks Not at all Not at all Not at all       Nathalia Back BSN, RN

## 2021-09-25 ENCOUNTER — HEALTH MAINTENANCE LETTER (OUTPATIENT)
Age: 26
End: 2021-09-25

## 2021-12-06 ENCOUNTER — VIRTUAL VISIT (OUTPATIENT)
Dept: FAMILY MEDICINE | Facility: CLINIC | Age: 26
End: 2021-12-06
Payer: COMMERCIAL

## 2021-12-06 DIAGNOSIS — F41.9 ANXIETY: Primary | ICD-10-CM

## 2021-12-06 DIAGNOSIS — F32.9 MAJOR DEPRESSIVE DISORDER WITH SINGLE EPISODE, REMISSION STATUS UNSPECIFIED: ICD-10-CM

## 2021-12-06 PROCEDURE — 99214 OFFICE O/P EST MOD 30 MIN: CPT | Mod: 95 | Performed by: FAMILY MEDICINE

## 2021-12-06 RX ORDER — VENLAFAXINE HYDROCHLORIDE 37.5 MG/1
CAPSULE, EXTENDED RELEASE ORAL
Qty: 30 CAPSULE | Refills: 0 | Status: SHIPPED | OUTPATIENT
Start: 2021-12-06 | End: 2022-02-07

## 2021-12-06 RX ORDER — VENLAFAXINE HYDROCHLORIDE 75 MG/1
75 CAPSULE, EXTENDED RELEASE ORAL DAILY
Qty: 30 CAPSULE | Refills: 0 | Status: SHIPPED | OUTPATIENT
Start: 2021-12-06 | End: 2022-01-28

## 2021-12-06 ASSESSMENT — ANXIETY QUESTIONNAIRES
GAD7 TOTAL SCORE: 16
7. FEELING AFRAID AS IF SOMETHING AWFUL MIGHT HAPPEN: SEVERAL DAYS
GAD7 TOTAL SCORE: 16
6. BECOMING EASILY ANNOYED OR IRRITABLE: MORE THAN HALF THE DAYS
GAD7 TOTAL SCORE: 16
8. IF YOU CHECKED OFF ANY PROBLEMS, HOW DIFFICULT HAVE THESE MADE IT FOR YOU TO DO YOUR WORK, TAKE CARE OF THINGS AT HOME, OR GET ALONG WITH OTHER PEOPLE?: EXTREMELY DIFFICULT
5. BEING SO RESTLESS THAT IT IS HARD TO SIT STILL: SEVERAL DAYS
2. NOT BEING ABLE TO STOP OR CONTROL WORRYING: NEARLY EVERY DAY
3. WORRYING TOO MUCH ABOUT DIFFERENT THINGS: NEARLY EVERY DAY
4. TROUBLE RELAXING: NEARLY EVERY DAY
7. FEELING AFRAID AS IF SOMETHING AWFUL MIGHT HAPPEN: SEVERAL DAYS
1. FEELING NERVOUS, ANXIOUS, OR ON EDGE: NEARLY EVERY DAY

## 2021-12-06 ASSESSMENT — PATIENT HEALTH QUESTIONNAIRE - PHQ9
10. IF YOU CHECKED OFF ANY PROBLEMS, HOW DIFFICULT HAVE THESE PROBLEMS MADE IT FOR YOU TO DO YOUR WORK, TAKE CARE OF THINGS AT HOME, OR GET ALONG WITH OTHER PEOPLE: VERY DIFFICULT
SUM OF ALL RESPONSES TO PHQ QUESTIONS 1-9: 13
SUM OF ALL RESPONSES TO PHQ QUESTIONS 1-9: 13

## 2021-12-06 NOTE — PROGRESS NOTES
Terrell is a 25 year old who is being evaluated via a billable video visit.      How would you like to obtain your AVS? MyChart  If the video visit is dropped, the invitation should be resent by: Text to cell phone: see epic  Will anyone else be joining your video visit? No      Video Start Time: 5:09 PM    Assessment & Plan     Anxiety  Major depressive disorder with single episode, remission status unspecified  Flare of sx's off meds  Never fully felt sertraline or fluoxetine/wellbutrin treated his sx's and would like to trial new med.   Will trial effexor.   Reviewed onset of action of meds, common side effects and plan for close f/u. Encouraged call to clinic if symptoms worsening or adverse reactions.   - venlafaxine (EFFEXOR-XR) 37.5 MG 24 hr capsule; Take 1 capsule (37.5 mg) by mouth daily for 14 days, THEN 2 capsules (75 mg) daily.  - venlafaxine (EFFEXOR-XR) 75 MG 24 hr capsule; Take 1 capsule (75 mg) by mouth daily             Patient Instructions   Start venlafaxine 37.5 mg daily in the morning  If tolerating, increase dose to 75 mg (2 tabs of the 37.5 mg dose until out and then one one capsule of the 75 mg dose) after two weeks.   Med check in 4 weeks.       No follow-ups on file.    Mallika Saavedra MD  [facility]   Terrell is a 25 year old who presents for the following health issues     History of Present Illness       Mental Health Follow-up:  Patient presents to follow-up on Depression & Anxiety.Patient's depression since last visit has been:  Better  The patient is having other symptoms associated with depression.  Patient's anxiety since last visit has been:  Bad  The patient is having other symptoms associated with anxiety.  Any significant life events: No  Patient is feeling anxious or having panic attacks.  Patient has no concerns about alcohol or drug use.     Social History  Tobacco Use    Smoking status: Never Smoker    Smokeless tobacco: Never  Used  Alcohol use: Yes    Comment: yolanda drinking on weekends,  not drinking since anxiety tx.  Drug use: No    Types: Marijuana      Today's PHQ-9         PHQ-9 Total Score:     (P) 13   PHQ-9 Q9 Thoughts of better off dead/self-harm past 2 weeks :   (P) Not at all   Thoughts of suicide or self harm:      Self-harm Plan:        Self-harm Action:          Safety concerns for self or others:           He eats 2-3 servings of fruits and vegetables daily.He consumes 1 sweetened beverage(s) daily.He exercises with enough effort to increase his heart rate 30 to 60 minutes per day.  He exercises with enough effort to increase his heart rate 4 days per week.   He is taking medications regularly.     PHQ-9 SCORE 12/19/2019 10/27/2020 12/6/2021   PHQ-9 Total Score MyChart - - 13 (Moderate depression)   PHQ-9 Total Score 8 14 13     SCAR-7 SCORE 8/15/2019 12/19/2019 12/6/2021   Total Score - - 16 (severe anxiety)   Total Score 4 12 16       Off meds for a while but now sx's are starting to creep back in  Had knee surgery a few months ago  As ramping back into normal function feeling depressios/anxiety ramping back up.     Has been off meds since July.   Felt that previous combo was taking edge of sx's but never fully treated his sxs.     Sx's are:  No panic  Hard to turn off brain at night to sleep.  Appetite reduced when anxiety is high  Worry about a lot of things.     Avoiding etoh. Makes anxiety worse.   occ use of marijuana  No self harm thoughts  Energy and motivation is fairly low.               Objective           Vitals:  No vitals were obtained today due to virtual visit.    Physical Exam   GENERAL: Healthy, alert and no distress  EYES: Eyes grossly normal to inspection.  No discharge or erythema, or obvious scleral/conjunctival abnormalities.  RESP: No audible wheeze, cough, or visible cyanosis.  No visible retractions or increased work of breathing.    SKIN: Visible skin clear. No significant rash, abnormal  pigmentation or lesions.  NEURO: Cranial nerves grossly intact.  Mentation and speech appropriate for age.  PSYCH: Mentation appears normal, affect normal/bright, judgement and insight intact, normal speech and appearance well-groomed.              Video-Visit Details    Type of service:  Video Visit    Video End Time:5:21 PM    Originating Location (pt. Location): Home    Distant Location (provider location):  [facility]     Platform used for Video Visit: Doximity  Answers for HPI/ROS submitted by the patient on 12/6/2021  If you checked off any problems, how difficult have these problems made it for you to do your work, take care of things at home, or get along with other people?: Very difficult  PHQ9 TOTAL SCORE: 13  SCAR 7 TOTAL SCORE: 16

## 2021-12-06 NOTE — PATIENT INSTRUCTIONS
Start venlafaxine 37.5 mg daily in the morning  If tolerating, increase dose to 75 mg (2 tabs of the 37.5 mg dose until out and then one one capsule of the 75 mg dose) after two weeks.   Med check in 4 weeks.

## 2021-12-07 ASSESSMENT — ANXIETY QUESTIONNAIRES: GAD7 TOTAL SCORE: 16

## 2021-12-07 ASSESSMENT — PATIENT HEALTH QUESTIONNAIRE - PHQ9: SUM OF ALL RESPONSES TO PHQ QUESTIONS 1-9: 13

## 2022-02-07 ENCOUNTER — OFFICE VISIT (OUTPATIENT)
Dept: FAMILY MEDICINE | Facility: CLINIC | Age: 27
End: 2022-02-07
Payer: COMMERCIAL

## 2022-02-07 VITALS
HEIGHT: 72 IN | BODY MASS INDEX: 19.31 KG/M2 | DIASTOLIC BLOOD PRESSURE: 70 MMHG | WEIGHT: 142.6 LBS | OXYGEN SATURATION: 97 % | HEART RATE: 72 BPM | TEMPERATURE: 98.2 F | SYSTOLIC BLOOD PRESSURE: 125 MMHG

## 2022-02-07 DIAGNOSIS — F32.9 MAJOR DEPRESSIVE DISORDER WITH SINGLE EPISODE, REMISSION STATUS UNSPECIFIED: ICD-10-CM

## 2022-02-07 DIAGNOSIS — F41.9 ANXIETY: Primary | ICD-10-CM

## 2022-02-07 PROCEDURE — 96127 BRIEF EMOTIONAL/BEHAV ASSMT: CPT | Performed by: FAMILY MEDICINE

## 2022-02-07 PROCEDURE — 90471 IMMUNIZATION ADMIN: CPT | Performed by: FAMILY MEDICINE

## 2022-02-07 PROCEDURE — 90472 IMMUNIZATION ADMIN EACH ADD: CPT | Performed by: FAMILY MEDICINE

## 2022-02-07 PROCEDURE — 90686 IIV4 VACC NO PRSV 0.5 ML IM: CPT | Performed by: FAMILY MEDICINE

## 2022-02-07 PROCEDURE — 90651 9VHPV VACCINE 2/3 DOSE IM: CPT | Performed by: FAMILY MEDICINE

## 2022-02-07 PROCEDURE — 99214 OFFICE O/P EST MOD 30 MIN: CPT | Mod: 25 | Performed by: FAMILY MEDICINE

## 2022-02-07 RX ORDER — VENLAFAXINE HYDROCHLORIDE 150 MG/1
150 CAPSULE, EXTENDED RELEASE ORAL DAILY
Qty: 30 CAPSULE | Refills: 1 | Status: SHIPPED | OUTPATIENT
Start: 2022-02-07 | End: 2022-03-21

## 2022-02-07 ASSESSMENT — MIFFLIN-ST. JEOR: SCORE: 1660.07

## 2022-02-07 ASSESSMENT — PATIENT HEALTH QUESTIONNAIRE - PHQ9
10. IF YOU CHECKED OFF ANY PROBLEMS, HOW DIFFICULT HAVE THESE PROBLEMS MADE IT FOR YOU TO DO YOUR WORK, TAKE CARE OF THINGS AT HOME, OR GET ALONG WITH OTHER PEOPLE: SOMEWHAT DIFFICULT
SUM OF ALL RESPONSES TO PHQ QUESTIONS 1-9: 7
SUM OF ALL RESPONSES TO PHQ QUESTIONS 1-9: 7

## 2022-02-07 ASSESSMENT — ANXIETY QUESTIONNAIRES
3. WORRYING TOO MUCH ABOUT DIFFERENT THINGS: SEVERAL DAYS
GAD7 TOTAL SCORE: 5
7. FEELING AFRAID AS IF SOMETHING AWFUL MIGHT HAPPEN: NOT AT ALL
1. FEELING NERVOUS, ANXIOUS, OR ON EDGE: SEVERAL DAYS
6. BECOMING EASILY ANNOYED OR IRRITABLE: NOT AT ALL
5. BEING SO RESTLESS THAT IT IS HARD TO SIT STILL: SEVERAL DAYS
GAD7 TOTAL SCORE: 5
7. FEELING AFRAID AS IF SOMETHING AWFUL MIGHT HAPPEN: NOT AT ALL
GAD7 TOTAL SCORE: 5
4. TROUBLE RELAXING: SEVERAL DAYS
2. NOT BEING ABLE TO STOP OR CONTROL WORRYING: SEVERAL DAYS

## 2022-02-07 ASSESSMENT — PAIN SCALES - GENERAL: PAINLEVEL: NO PAIN (0)

## 2022-02-07 NOTE — NURSING NOTE
Prior to immunization administration, verified patients identity using patient s name and date of birth. Please see Immunization Activity for additional information.     Screening Questionnaire for Adult Immunization    Are you sick today?   No   Do you have allergies to medications, food, a vaccine component or latex?   No   Have you ever had a serious reaction after receiving a vaccination?   No   Do you have a long-term health problem with heart, lung, kidney, or metabolic disease (e.g., diabetes), asthma, a blood disorder, no spleen, complement component deficiency, a cochlear implant, or a spinal fluid leak?  Are you on long-term aspirin therapy?   No   Do you have cancer, leukemia, HIV/AIDS, or any other immune system problem?   No   Do you have a parent, brother, or sister with an immune system problem?   No   In the past 3 months, have you taken medications that affect  your immune system, such as prednisone, other steroids, or anticancer drugs; drugs for the treatment of rheumatoid arthritis, Crohn s disease, or psoriasis; or have you had radiation treatments?   No   Have you had a seizure, or a brain or other nervous system problem?   No   During the past year, have you received a transfusion of blood or blood    products, or been given immune (gamma) globulin or antiviral drug?   No   For women: Are you pregnant or is there a chance you could become       pregnant during the next month?   No   Have you received any vaccinations in the past 4 weeks?   No     Immunization questionnaire answers were all negative.        Per orders of , injection of Flu, HPV given by Xenia Way. Patient instructed to remain in clinic for 15 minutes afterwards, and to report any adverse reaction to me immediately.       Screening performed by Xenia [address] on 2/7/2022 at 5:16 PM.

## 2022-02-07 NOTE — PROGRESS NOTES
"Answers for HPI/ROS submitted by the patient on 2/7/2022  If you checked off any problems, how difficult have these problems made it for you to do your work, take care of things at home, or get along with other people?: Somewhat difficult  PHQ9 TOTAL SCORE: 7  SCAR 7 TOTAL SCORE: 5      Assessment & Plan     Anxiety  Major depressive disorder with single episode, remission status unspecified  Much improved with start of venlafaxine but still some ongoing sxs. Will trial pushing up dose to 150 mg. Reviewed onset of action of meds, common side effects and plan for close f/u. Encouraged call to clinic if symptoms worsening or adverse reactions.   F/u in 4-8 weeks for repeat med check.   - venlafaxine (EFFEXOR-XR) 150 MG 24 hr capsule; Take 1 capsule (150 mg) by mouth daily    HPV and Flu vaccines to be given today.         Estimated body mass index is 19.5 kg/m  as calculated from the following:    Height as of this encounter: 1.821 m (5' 11.7\").    Weight as of this encounter: 64.7 kg (142 lb 9.6 oz).  No results found for: GFRESTIMATED  No results found for: [id number]      Return in about 6 weeks (around 3/21/2022) for Follow up, med check.    Mallika Saavedra MD  [facility] HEVER Tejada is a 26 year old who presents for the following health issues     History of Present Illness       Mental Health Follow-up:  Patient presents to follow-up on Depression & Anxiety.Patient's depression since last visit has been:  Better  The patient is not having other symptoms associated with depression.  Patient's anxiety since last visit has been:  Good  The patient is having other symptoms associated with anxiety.  Any significant life events: No  Patient is feeling anxious or having panic attacks.  Patient has no concerns about alcohol or drug use.     Social History  Tobacco Use    Smoking status: Never Smoker    Smokeless tobacco: Never Used  Alcohol use: Yes    Comment: yolanda drinking " "on weekends,  not drinking since anxiety tx.  Drug use: No    Types: Marijuana      Today's PHQ-9         PHQ-9 Total Score:     (P) 7   PHQ-9 Q9 Thoughts of better off dead/self-harm past 2 weeks :   (P) Not at all   Thoughts of suicide or self harm:      Self-harm Plan:        Self-harm Action:          Safety concerns for self or others:           He eats 2-3 servings of fruits and vegetables daily.He consumes 0 sweetened beverage(s) daily.He exercises with enough effort to increase his heart rate 60 or more minutes per day.  He exercises with enough effort to increase his heart rate 6 days per week.   He is taking medications regularly.     Started venlafaxine last visit  Mood is \"headed in the right direction\". Better than previous meds.  Still mild anxiety present  Still some insomnia  Appetite still down. Feels due to anxiety. Can't keep up with calories with how much he is working out.   Feel at least 50% better.   No longer sitting around all day feeling anxious.     Mild stomach upset when first started med but that has resolved. .     THC- has completely stopped. Wanted to try new med without the thc.             Objective    /70   Pulse 72   Temp 98.2  F (36.8  C) (Oral)   Ht 1.821 m (5' 11.7\")   Wt 64.7 kg (142 lb 9.6 oz)   SpO2 97%   BMI 19.50 kg/m    Body mass index is 19.5 kg/m .  Physical Exam   GENERAL: healthy, alert and no distress  NECK: no adenopathy, no asymmetry, masses, or scars and thyroid normal to palpation  RESP: lungs clear to auscultation - no rales, rhonchi or wheezes  CV: regular rate and rhythm, normal S1 S2, no S3 or S4, no murmur, click or rub, no peripheral edema and peripheral pulses strong  MS: no gross musculoskeletal defects noted, no edema    PHQ-9 SCORE 10/27/2020 12/6/2021 2/7/2022   PHQ-9 Total Score MyChart - 13 (Moderate depression) 7 (Mild depression)   PHQ-9 Total Score 14 13 7     SCAR-7 SCORE 12/19/2019 12/6/2021 2/7/2022   Total Score - 16 (severe " anxiety) 5 (mild anxiety)   Total Score 12 16 5

## 2022-02-08 ASSESSMENT — ANXIETY QUESTIONNAIRES: GAD7 TOTAL SCORE: 5

## 2022-02-08 ASSESSMENT — PATIENT HEALTH QUESTIONNAIRE - PHQ9: SUM OF ALL RESPONSES TO PHQ QUESTIONS 1-9: 7

## 2022-03-12 ENCOUNTER — HEALTH MAINTENANCE LETTER (OUTPATIENT)
Age: 27
End: 2022-03-12

## 2022-03-21 ENCOUNTER — OFFICE VISIT (OUTPATIENT)
Dept: FAMILY MEDICINE | Facility: CLINIC | Age: 27
End: 2022-03-21
Payer: COMMERCIAL

## 2022-03-21 VITALS
WEIGHT: 152.8 LBS | RESPIRATION RATE: 20 BRPM | BODY MASS INDEX: 20.9 KG/M2 | SYSTOLIC BLOOD PRESSURE: 123 MMHG | DIASTOLIC BLOOD PRESSURE: 82 MMHG | HEART RATE: 80 BPM | TEMPERATURE: 97.7 F | OXYGEN SATURATION: 97 %

## 2022-03-21 DIAGNOSIS — F41.9 ANXIETY: ICD-10-CM

## 2022-03-21 DIAGNOSIS — F32.9 MAJOR DEPRESSIVE DISORDER WITH SINGLE EPISODE, REMISSION STATUS UNSPECIFIED: ICD-10-CM

## 2022-03-21 PROCEDURE — 99213 OFFICE O/P EST LOW 20 MIN: CPT | Performed by: FAMILY MEDICINE

## 2022-03-21 RX ORDER — VENLAFAXINE HYDROCHLORIDE 150 MG/1
150 CAPSULE, EXTENDED RELEASE ORAL DAILY
Qty: 90 CAPSULE | Refills: 1 | Status: SHIPPED | OUTPATIENT
Start: 2022-03-21 | End: 2022-10-03

## 2022-03-21 ASSESSMENT — ANXIETY QUESTIONNAIRES
6. BECOMING EASILY ANNOYED OR IRRITABLE: NOT AT ALL
5. BEING SO RESTLESS THAT IT IS HARD TO SIT STILL: SEVERAL DAYS
3. WORRYING TOO MUCH ABOUT DIFFERENT THINGS: SEVERAL DAYS
GAD7 TOTAL SCORE: 2
GAD7 TOTAL SCORE: 2
7. FEELING AFRAID AS IF SOMETHING AWFUL MIGHT HAPPEN: NOT AT ALL
4. TROUBLE RELAXING: NOT AT ALL
GAD7 TOTAL SCORE: 2
7. FEELING AFRAID AS IF SOMETHING AWFUL MIGHT HAPPEN: NOT AT ALL
2. NOT BEING ABLE TO STOP OR CONTROL WORRYING: NOT AT ALL
1. FEELING NERVOUS, ANXIOUS, OR ON EDGE: NOT AT ALL

## 2022-03-21 ASSESSMENT — PATIENT HEALTH QUESTIONNAIRE - PHQ9
SUM OF ALL RESPONSES TO PHQ QUESTIONS 1-9: 4
SUM OF ALL RESPONSES TO PHQ QUESTIONS 1-9: 4
10. IF YOU CHECKED OFF ANY PROBLEMS, HOW DIFFICULT HAVE THESE PROBLEMS MADE IT FOR YOU TO DO YOUR WORK, TAKE CARE OF THINGS AT HOME, OR GET ALONG WITH OTHER PEOPLE: NOT DIFFICULT AT ALL

## 2022-03-21 ASSESSMENT — PAIN SCALES - GENERAL: PAINLEVEL: NO PAIN (0)

## 2022-03-21 NOTE — PROGRESS NOTES
Assessment & Plan     Anxiety  Major depressive disorder with single episode, remission status unspecified  Doing well with dose change and symptoms current controlled. Continue current venlafaxine dosing.  Is deciding on next stage of life but has been taking a while to do this (considering college again vs  studies). Discussed option for counseling if he is feeling stuck or would like to process through recent events. Declines for now but will consider in future if needed.   - venlafaxine (EFFEXOR-XR) 150 MG 24 hr capsule; Take 1 capsule (150 mg) by mouth daily    6}      Return in about 6 months (around 9/21/2022) for med check.    Mallika Saavedra MD  [facility] HEVER Tejada is a 26 year old who presents for the following health issues     History of Present Illness       Mental Health Follow-up:  Patient presents to follow-up on Depression & Anxiety.Patient's depression since last visit has been:  Better  The patient is not having other symptoms associated with depression.  Patient's anxiety since last visit has been:  Better  The patient is not having other symptoms associated with anxiety.  Any significant life events: No  Patient is not feeling anxious or having panic attacks.  Patient has no concerns about alcohol or drug use.       Today's PHQ-9         PHQ-9 Total Score: 4  PHQ-9 Q9 Thoughts of better off dead/self-harm past 2 weeks :   (P) Not at all    How difficult have these problems made it for you to do your work, take care of things at home, or get along with other people: Not difficult at all    Today's SCAR-7 Score: 2    He eats 2-3 servings of fruits and vegetables daily.He consumes 0 sweetened beverage(s) daily.He exercises with enough effort to increase his heart rate 30 to 60 minutes per day.  He exercises with enough effort to increase his heart rate 6 days per week.   He is taking medications regularly.     On increased venlafaxine since  "2/7/22  Overall mood is good. \"in a good place\", satisfied with how he's feeling.   Anxiety is now better managed, sleep is also better.   No AE.     Continues to be off THC.     Has been house sitting/caring for dogs.     Work out 5-6 x's a week. Notes always fast metabolism. Nutrition is good.           Objective    /82 (BP Location: Right arm, Patient Position: Sitting, Cuff Size: Adult Regular)   Pulse 80   Temp 97.7  F (36.5  C) (Oral)   Resp 20   Wt 69.3 kg (152 lb 12.8 oz)   SpO2 97%   BMI 20.90 kg/m    Body mass index is 20.9 kg/m .  Physical Exam   GENERAL: healthy, alert and no distress  NECK: no adenopathy, no asymmetry, masses, or scars and thyroid normal to palpation  RESP: lungs clear to auscultation - no rales, rhonchi or wheezes  CV: regular rate and rhythm, normal S1 S2, no S3 or S4, no murmur, click or rub, no peripheral edema and peripheral pulses strong  MS: no gross musculoskeletal defects noted, no edema  PSYCH: mentation appears normal, affect normal/bright        PHQ-9 SCORE 12/6/2021 2/7/2022 3/21/2022   PHQ-9 Total Score MyChart 13 (Moderate depression) 7 (Mild depression) 4 (Minimal depression)   PHQ-9 Total Score 13 7 4     SCAR-7 SCORE 12/6/2021 2/7/2022 3/21/2022   Total Score 16 (severe anxiety) 5 (mild anxiety) 2 (minimal anxiety)   Total Score 16 5 2                 "

## 2022-03-22 ASSESSMENT — ANXIETY QUESTIONNAIRES: GAD7 TOTAL SCORE: 2

## 2022-03-22 ASSESSMENT — PATIENT HEALTH QUESTIONNAIRE - PHQ9: SUM OF ALL RESPONSES TO PHQ QUESTIONS 1-9: 4

## 2022-10-25 ENCOUNTER — VIRTUAL VISIT (OUTPATIENT)
Dept: FAMILY MEDICINE | Facility: CLINIC | Age: 27
End: 2022-10-25
Payer: COMMERCIAL

## 2022-10-25 DIAGNOSIS — F33.1 MAJOR DEPRESSIVE DISORDER, RECURRENT EPISODE, MODERATE (H): ICD-10-CM

## 2022-10-25 DIAGNOSIS — F41.9 ANXIETY: Primary | ICD-10-CM

## 2022-10-25 PROCEDURE — 99214 OFFICE O/P EST MOD 30 MIN: CPT | Mod: 95 | Performed by: FAMILY MEDICINE

## 2022-10-25 RX ORDER — HYDROXYZINE HYDROCHLORIDE 25 MG/1
25-50 TABLET, FILM COATED ORAL EVERY 6 HOURS PRN
Qty: 60 TABLET | Refills: 1 | Status: SHIPPED | OUTPATIENT
Start: 2022-10-25 | End: 2023-06-19

## 2022-10-25 RX ORDER — VENLAFAXINE HYDROCHLORIDE 150 MG/1
150 CAPSULE, EXTENDED RELEASE ORAL DAILY
Qty: 90 CAPSULE | Refills: 1 | Status: SHIPPED | OUTPATIENT
Start: 2022-10-25 | End: 2023-06-19

## 2022-10-25 ASSESSMENT — ANXIETY QUESTIONNAIRES
7. FEELING AFRAID AS IF SOMETHING AWFUL MIGHT HAPPEN: NOT AT ALL
GAD7 TOTAL SCORE: 3
GAD7 TOTAL SCORE: 3
5. BEING SO RESTLESS THAT IT IS HARD TO SIT STILL: NOT AT ALL
3. WORRYING TOO MUCH ABOUT DIFFERENT THINGS: SEVERAL DAYS
6. BECOMING EASILY ANNOYED OR IRRITABLE: NOT AT ALL
1. FEELING NERVOUS, ANXIOUS, OR ON EDGE: SEVERAL DAYS
2. NOT BEING ABLE TO STOP OR CONTROL WORRYING: NOT AT ALL
IF YOU CHECKED OFF ANY PROBLEMS ON THIS QUESTIONNAIRE, HOW DIFFICULT HAVE THESE PROBLEMS MADE IT FOR YOU TO DO YOUR WORK, TAKE CARE OF THINGS AT HOME, OR GET ALONG WITH OTHER PEOPLE: NOT DIFFICULT AT ALL
7. FEELING AFRAID AS IF SOMETHING AWFUL MIGHT HAPPEN: NOT AT ALL
GAD7 TOTAL SCORE: 3
8. IF YOU CHECKED OFF ANY PROBLEMS, HOW DIFFICULT HAVE THESE MADE IT FOR YOU TO DO YOUR WORK, TAKE CARE OF THINGS AT HOME, OR GET ALONG WITH OTHER PEOPLE?: NOT DIFFICULT AT ALL
4. TROUBLE RELAXING: SEVERAL DAYS

## 2022-10-25 ASSESSMENT — PATIENT HEALTH QUESTIONNAIRE - PHQ9
SUM OF ALL RESPONSES TO PHQ QUESTIONS 1-9: 2
SUM OF ALL RESPONSES TO PHQ QUESTIONS 1-9: 2
10. IF YOU CHECKED OFF ANY PROBLEMS, HOW DIFFICULT HAVE THESE PROBLEMS MADE IT FOR YOU TO DO YOUR WORK, TAKE CARE OF THINGS AT HOME, OR GET ALONG WITH OTHER PEOPLE: SOMEWHAT DIFFICULT

## 2022-10-25 NOTE — PROGRESS NOTES
Terrell is a 26 year old who is being evaluated via a billable video visit.      How would you like to obtain your AVS? MyChart  If the video visit is dropped, the invitation should be resent by: Text to cell phone: [phone]  Will anyone else be joining your video visit? No          Assessment & Plan     Anxiety  Major depressive disorder, recurrent episode, moderate (H)  Overall doing well with sx's well controlled. occ insomnia. Trial of hydroxyzine discussed for this. Continue current venlafaxine dose. F/u prn and in 6 mo   - hydrOXYzine (ATARAX) 25 MG tablet; Take 1-2 tablets (25-50 mg) by mouth every 6 hours as needed for anxiety  - venlafaxine (EFFEXOR XR) 150 MG 24 hr capsule; Take 1 capsule (150 mg) by mouth daily           Patient Instructions   Get your flu vaccine and covid booster vaccination this fall.       Return in about 6 months (around 4/25/2023) for Routine preventive, med check, in person.    Mallika Saavedra MD  [facility]   Terrell is a 26 year old, presenting for the following health issues:  No chief complaint on file.      History of Present Illness       Mental Health Follow-up:  Patient presents to follow-up on Depression & Anxiety.Patient's depression since last visit has been:  Better  The patient is not having other symptoms associated with depression.  Patient's anxiety since last visit has been:  Better  The patient is not having other symptoms associated with anxiety.  Any significant life events: No  Patient is not feeling anxious or having panic attacks.  Patient has no concerns about alcohol or drug use.    He eats 2-3 servings of fruits and vegetables daily.He consumes 1 sweetened beverage(s) daily.He exercises with enough effort to increase his heart rate 60 or more minutes per day.  He exercises with enough effort to increase his heart rate 5 days per week.   He is taking medications regularly.    Today's PHQ-9         PHQ-9 Total  "Score: 2    PHQ-9 Q9 Thoughts of better off dead/self-harm past 2 weeks :   Not at all    How difficult have these problems made it for you to do your work, take care of things at home, or get along with other people: Somewhat difficult  Today's SCAR-7 Score: 3     Noting big improvement   Got a job at [employer] the last 4 months  Anxiety has been much better on velafaxine.     No AE from med. Needs to take with food otherwise can cause stomach pain.   No missed doses.     Lots of stressors at work.   Eating better  Sleep can still be a \"little iffy\" but that is how it's always been. Hard to stay asleep. Some middle of the night wakening and then hard to fall back to sleep (ruminating thoughts) few times a week. Will take nap after work when this happens.   Working on getting on better   etoh- only drinking socially, few drinks a month  No thc.                        Objective           Vitals:  No vitals were obtained today due to virtual visit.    Physical Exam   GENERAL: Healthy, alert and no distress  EYES: Eyes grossly normal to inspection.  No discharge or erythema, or obvious scleral/conjunctival abnormalities.  RESP: No audible wheeze, cough, or visible cyanosis.  No visible retractions or increased work of breathing.    SKIN: Visible skin clear. No significant rash, abnormal pigmentation or lesions.  NEURO: Cranial nerves grossly intact.  Mentation and speech appropriate for age.  PSYCH: Mentation appears normal, affect normal/bright, judgement and insight intact, normal speech and appearance well-groomed.                Video-Visit Details    Video Start Time: 7:30 AM    Type of service:  Video Visit    Video End Time:7:46 AM    Originating Location (pt. Location): Home        Distant Location (provider location):  Off-site    Platform used for Video Visit: Gabrielle"

## 2023-01-07 ENCOUNTER — HEALTH MAINTENANCE LETTER (OUTPATIENT)
Age: 28
End: 2023-01-07

## 2023-04-22 ENCOUNTER — HEALTH MAINTENANCE LETTER (OUTPATIENT)
Age: 28
End: 2023-04-22

## 2023-07-21 NOTE — TELEPHONE ENCOUNTER
"Requested Prescriptions   Pending Prescriptions Disp Refills     FLUoxetine (PROZAC) 20 MG capsule [Pharmacy Med Name: FLUoxetine HCl Oral Capsule 20 MG] 30 capsule 0     Sig: TAKE ONE CAPSULE BY MOUTH ONE TIME DAILY       SSRIs Protocol Passed - 7/7/2019  7:02 AM        Passed - Recent (12 mo) or future (30 days) visit within the authorizing provider's specialty     Patient had office visit in the last 12 months or has a visit in the next 30 days with authorizing provider or within the authorizing provider's specialty.  See \"Patient Info\" tab in inbasket, or \"Choose Columns\" in Meds & Orders section of the refill encounter.              Passed - Medication is active on med list        Passed - Patient is age 18 or older        FLUoxetine (PROZAC) 20 MG capsule  Last Written Prescription Date:  3/25/19  Last Fill Quantity: 30,  # refills: 1   Last office visit: 11/23/2018 with prescribing provider:  Dr. Barriga   Future Office Visit:      PHQ-9 SCORE 11/6/2018 1/9/2019 2/18/2019   PHQ-9 Total Score MyChart - - 7 (Mild depression)   PHQ-9 Total Score 6 12 7     SCAR-7 SCORE 11/6/2018 1/9/2019 2/18/2019   Total Score - - 7 (mild anxiety)   Total Score 4 11 7         " Discharge instructions reviewed with pt. Pt verbalized understanding and walked off floor without difficulty.

## 2024-06-29 ENCOUNTER — HEALTH MAINTENANCE LETTER (OUTPATIENT)
Age: 29
End: 2024-06-29

## 2025-07-12 ENCOUNTER — HEALTH MAINTENANCE LETTER (OUTPATIENT)
Age: 30
End: 2025-07-12